# Patient Record
Sex: MALE | Race: WHITE | NOT HISPANIC OR LATINO | Employment: FULL TIME | URBAN - METROPOLITAN AREA
[De-identification: names, ages, dates, MRNs, and addresses within clinical notes are randomized per-mention and may not be internally consistent; named-entity substitution may affect disease eponyms.]

---

## 2017-11-01 ENCOUNTER — OFFICE VISIT (OUTPATIENT)
Dept: URGENT CARE | Facility: CLINIC | Age: 32
End: 2017-11-01
Payer: COMMERCIAL

## 2017-11-01 VITALS
DIASTOLIC BLOOD PRESSURE: 88 MMHG | OXYGEN SATURATION: 98 % | SYSTOLIC BLOOD PRESSURE: 142 MMHG | RESPIRATION RATE: 16 BRPM | HEART RATE: 68 BPM | HEIGHT: 74 IN | BODY MASS INDEX: 31.95 KG/M2 | WEIGHT: 249 LBS | TEMPERATURE: 97.6 F

## 2017-11-01 DIAGNOSIS — I10 ESSENTIAL HYPERTENSION: ICD-10-CM

## 2017-11-01 DIAGNOSIS — R20.2 TINGLING IN EXTREMITIES: ICD-10-CM

## 2017-11-01 DIAGNOSIS — I10 ELEVATED BLOOD PRESSURE READING IN OFFICE WITH DIAGNOSIS OF HYPERTENSION: ICD-10-CM

## 2017-11-01 PROCEDURE — 99214 OFFICE O/P EST MOD 30 MIN: CPT | Performed by: NURSE PRACTITIONER

## 2017-11-01 PROCEDURE — 93000 ELECTROCARDIOGRAM COMPLETE: CPT | Performed by: NURSE PRACTITIONER

## 2017-11-01 RX ORDER — ENALAPRIL MALEATE 20 MG/1
20 TABLET ORAL DAILY
Qty: 30 TAB | Refills: 0 | Status: SHIPPED | OUTPATIENT
Start: 2017-11-01 | End: 2017-11-07 | Stop reason: SDUPTHER

## 2017-11-01 RX ORDER — CLONIDINE HYDROCHLORIDE 0.1 MG/1
0.1 TABLET ORAL ONCE
Status: COMPLETED | OUTPATIENT
Start: 2017-11-01 | End: 2017-11-01

## 2017-11-01 RX ADMIN — CLONIDINE HYDROCHLORIDE 0.1 MG: 0.1 TABLET ORAL at 10:41

## 2017-11-01 NOTE — PROGRESS NOTES
Chief Complaint   Patient presents with   • Blood Pressure Problem     x 1 month, blood pressure was eliseo       HISTORY OF PRESENT ILLNESS: Patient is a 32 y.o. male who presents to urgent care today with complaints of high blood pressure. States he was diagnosed with hypertension several years ago and has been treated with ACE inhibitor in the past with good results. He last was prescribed Enalapril two years ago, which he took consistently, but felt his blood pressure was improving and therefore stopped taking. He checked his blood pressure one month ago and was elevated, therefore he is here today for medication management. He admits to intermittent tingling sensation to bilateral hands over the past several months, otherwise is asymptomatic. He denies chest pain, shortness of breath, neck pain, back pain, nausea, or dizziness.       Patient Active Problem List    Diagnosis Date Noted   • Reactive airway disease with wheezing    • Hypertension        Allergies:Review of patient's allergies indicates no known allergies.    Current Outpatient Prescriptions Ordered in [facility]   Medication Sig Dispense Refill   • Cetirizine HCl (ZYRTEC ALLERGY PO) Take  by mouth.     • enalapril (VASOTEC) 20 MG tablet Take 1 Tab by mouth every day. 30 Tab 0     No current Epic-ordered facility-administered medications on file.        Past Medical History:   Diagnosis Date   • Hypertension    • Reactive airway disease with wheezing     occasional to smoke or mold       Social History   Substance Use Topics   • Smoking status: Former Smoker     Quit date: 3/1/2011   • Smokeless tobacco: Current User      Comment: discussed   • Alcohol use 8.0 oz/week     16 drink(s) per week       Family Status   Relation Status   • Paternal Grandfather    • Paternal Uncle    • Maternal Grandfather      Family History   Problem Relation Age of Onset   • Stroke Paternal Grandfather    • Hypertension Paternal Grandfather    • Hypertension Paternal Uncle   "  • Cancer Maternal Grandfather      prostate, colon       ROS:  Review of Systems   Constitutional: Negative for fever, chills, weight loss, malaise, and fatigue.   HENT: Negative for ear pain, nosebleeds, congestion, sore throat and neck pain.    Eyes: Negative for vision changes.   Neuro: Positive for intermittent hand tingling. Negative for headache, droop, difficulty with speech, weakness, seizure, LOC.   Cardiovascular: Negative for chest pain, palpitations, orthopnea and leg swelling.   Respiratory: Negative for cough, sputum production, shortness of breath and wheezing.   Gastrointestinal: Negative for abdominal pain, nausea, vomiting or diarrhea.   Genitourinary: Negative for dysuria, urgency and frequency.  Musculoskeletal: Negative for falls, neck pain, back pain, joint pain, myalgias.   Skin: Negative for rash, diaphoresis.     Exam:  Blood pressure (!) 172/118, pulse 74, temperature 36.4 °C (97.6 °F), resp. rate 16, height 1.88 m (6' 2.02\"), weight 112.9 kg (249 lb), SpO2 98 %.  General: well-nourished, well-developed male in NAD  Head: normocephalic, atraumatic  Eyes: PERRLA, no conjunctival injection, acuity grossly intact, lids normal.  Ears: normal shape and symmetry, no tenderness, no discharge. External canals are without any significant edema or erythema. Tympanic membranes are without any inflammation, no effusion. Gross auditory acuity is intact.  Nose: symmetrical without tenderness, no discharge.  Mouth/Throat: reasonable hygiene, no erythema, exudates or tonsillar enlargement.  Neck: no masses, range of motion within normal limits, no tracheal deviation. No obvious thyroid enlargement.   Lymph: no cervical adenopathy. No supraclavicular adenopathy.   Neuro: alert and oriented. Cranial nerves 1-12 grossly intact. No sensory deficit.   Cardiovascular: regular rate and rhythm. No edema. Hypertensive today.   Pulmonary: no distress. Chest is symmetrical with respiration, no wheezes, crackles, or " rhonchi.   Musculoskeletal: no clubbing, appropriate muscle tone, gait is stable.  Skin: warm, dry, intact, no clubbing, no cyanosis, no rashes.   Psych: appropriate mood, affect, judgement.         Assessment/Plan:  1. Elevated blood pressure reading in office with diagnosis of hypertension  EKG    clonidine (CATAPRES) tablet 0.1 mg    enalapril (VASOTEC) 20 MG tablet   2. Essential hypertension  enalapril (VASOTEC) 20 MG tablet   3. Tingling in extremities  EKG         EKG Interpretation   Interpreted by me   Rhythm: normal sinus   Rate: normal   Axis: normal   Ectopy: none   Conduction: normal   ST Segments: no acute change   T Waves: no acute change   Q Waves: none   Clinical Impression: no acute changes and normal EKG      Patient given clonidine in the clinic, monitored for 20 minutes, blood pressure reduced to 168/108. He is asymptomatic today but does report intermittent tingling to hands, therefore ECG performed, does not show acute ST changes.   A low salt diet, increasing exercise, and reducing alcohol in diet have all been addressed.   He will be given a short course of Enalapril to take until he can be seen by a PCP. S/S hypotension discussed, if so may cut in half. Monitor and log blood pressures daily.   Supportive care, differential diagnoses, and indications for immediate follow-up discussed with patient.   Pathogenesis of diagnosis discussed including typical length and natural progression.   Instructed to return to clinic or nearest emergency department for any change in condition, further concerns, or worsening of symptoms.  Patient states understanding of the plan of care and discharge instructions.  Instructed to make an appointment, to establish care and for follow up, with a primary care provider.        Please note that this dictation was created using voice recognition software. I have made every reasonable attempt to correct obvious errors, but I expect that there are errors of grammar and  possibly content that I did not discover before finalizing the note.      ARMANDO Stinson.

## 2017-11-07 ENCOUNTER — OFFICE VISIT (OUTPATIENT)
Dept: MEDICAL GROUP | Facility: MEDICAL CENTER | Age: 32
End: 2017-11-07
Payer: COMMERCIAL

## 2017-11-07 VITALS
TEMPERATURE: 97.5 F | OXYGEN SATURATION: 96 % | HEART RATE: 57 BPM | DIASTOLIC BLOOD PRESSURE: 70 MMHG | HEIGHT: 74 IN | BODY MASS INDEX: 32.8 KG/M2 | SYSTOLIC BLOOD PRESSURE: 138 MMHG | WEIGHT: 255.6 LBS

## 2017-11-07 DIAGNOSIS — Z11.3 SCREENING FOR STD (SEXUALLY TRANSMITTED DISEASE): ICD-10-CM

## 2017-11-07 DIAGNOSIS — Z00.00 HEALTHCARE MAINTENANCE: ICD-10-CM

## 2017-11-07 DIAGNOSIS — I10 ESSENTIAL HYPERTENSION: ICD-10-CM

## 2017-11-07 DIAGNOSIS — Z13.1 SCREENING FOR DIABETES MELLITUS: ICD-10-CM

## 2017-11-07 DIAGNOSIS — Z13.6 SCREENING FOR ISCHEMIC HEART DISEASE: ICD-10-CM

## 2017-11-07 DIAGNOSIS — J45.20 MILD INTERMITTENT REACTIVE AIRWAY DISEASE WITH WHEEZING WITHOUT COMPLICATION: ICD-10-CM

## 2017-11-07 PROCEDURE — 99213 OFFICE O/P EST LOW 20 MIN: CPT | Performed by: FAMILY MEDICINE

## 2017-11-07 RX ORDER — ENALAPRIL MALEATE 20 MG/1
20 TABLET ORAL DAILY
Qty: 90 TAB | Refills: 3 | Status: SHIPPED | OUTPATIENT
Start: 2017-11-07 | End: 2018-08-23

## 2017-11-07 ASSESSMENT — PATIENT HEALTH QUESTIONNAIRE - PHQ9: CLINICAL INTERPRETATION OF PHQ2 SCORE: 0

## 2017-11-07 NOTE — ASSESSMENT & PLAN NOTE
The patient declines flu shot today. He is interested in gonorrhea, chlamydia, RPR and HIV testing.

## 2017-11-07 NOTE — ASSESSMENT & PLAN NOTE
The patient has a history of reactive airway disease, described as occasional wheezing and chest tightness after running or exacerbated by smoke. He says this occurs about 3-5 times monthly but he has not taken albuterol for the past 7-12 months. Previously, he has tried albuterol, however, and it does seem to work quite well. He denies any symptoms today. He denies chest pain and shortness of breath.

## 2017-11-07 NOTE — PROGRESS NOTES
[facility]  Progress Note  Established Patient    Subjective:   Vaibhav Guillermo is a 32 y.o. male here today with a chief complaint of HTN. The patient is alone.     Reactive airway disease with wheezing  The patient has a history of reactive airway disease, described as occasional wheezing and chest tightness after running or exacerbated by smoke. He says this occurs about 3-5 times monthly but he has not taken albuterol for the past 7-12 months. Previously, he has tried albuterol, however, and it does seem to work quite well. He denies any symptoms today. He denies chest pain and shortness of breath.    Hypertension  Patient has hypertension. He was recently started on enalapril 20 mg daily. He is tolerating this medication well. He denies chest pain, shortness of breath, headache, numbness, tingling, dizziness, lightheadedness. His blood pressure is at goal.    Healthcare maintenance  The patient declines flu shot today. He is interested in gonorrhea, chlamydia, RPR and HIV testing.      Current Outpatient Prescriptions on File Prior to Visit   Medication Sig Dispense Refill   • Cetirizine HCl (ZYRTEC ALLERGY PO) Take  by mouth.       No current facility-administered medications on file prior to visit.        Past Medical History:   Diagnosis Date   • Allergy    • Asthma    • Hypertension    • Reactive airway disease with wheezing     occasional to smoke or mold       Allergies: Review of patient's allergies indicates no known allergies.    Surgical History:  has no past surgical history on file.    Family History: family history includes Cancer in his maternal grandfather; Hypertension in his father, paternal grandfather, and paternal uncle; No Known Problems in his mother and sister; Stroke in his paternal grandfather.    Social History:  reports that he quit smoking about 6 years ago. He quit smokeless tobacco use 7 days ago. He reports that he drinks about 8.0 oz of alcohol per week . He reports that he  "uses drugs, including Marijuana.    ROS: see HPI. .        Objective:     Vitals:    11/07/17 1209 11/07/17 1212   BP: 140/78 138/70   Pulse: (!) 57    Temp: 36.4 °C (97.5 °F)    SpO2: 96%    Weight: 115.9 kg (255 lb 9.6 oz)    Height: 1.88 m (6' 2.02\")        Physical Exam:  General: alert in no apparent distress.   Cardio: regular rate and rhythm, no murmurs, rubs or gallops.   Resp: CTAB no w/r/r.   Ext: no BLE edema.         Assessment and Plan:     1. Essential hypertension  Doing well and at goal. Asymptomatic.   - enalapril (VASOTEC) 20 MG tablet; Take 1 Tab by mouth every day.  Dispense: 90 Tab; Refill: 3  - BASIC METABOLIC PANEL; Future    2. Mild intermittent reactive airway disease with wheezing without complication  Offered PFT. Patient would like to look into cost.   - Albuterol Sulfate 108 (90 Base) MCG/ACT AEROSOL POWDER, BREATH ACTIVATED; Inhale 1-2 Puffs by mouth every 6 hours as needed (shortness of breath).  Dispense: 1 Each; Refill: 6    3. Healthcare maintenance  See HPI.    Followup: Return in about 3 weeks (around 11/28/2017), or if symptoms worsen or fail to improve.         "

## 2017-11-07 NOTE — ASSESSMENT & PLAN NOTE
Patient has hypertension. He was recently started on enalapril 20 mg daily. He is tolerating this medication well. He denies chest pain, shortness of breath, headache, numbness, tingling, dizziness, lightheadedness. His blood pressure is at goal.

## 2017-11-17 ENCOUNTER — HOSPITAL ENCOUNTER (OUTPATIENT)
Dept: LAB | Facility: MEDICAL CENTER | Age: 32
End: 2017-11-17
Attending: FAMILY MEDICINE
Payer: COMMERCIAL

## 2017-11-17 DIAGNOSIS — Z13.1 SCREENING FOR DIABETES MELLITUS: ICD-10-CM

## 2017-11-17 DIAGNOSIS — Z13.6 SCREENING FOR ISCHEMIC HEART DISEASE: ICD-10-CM

## 2017-11-17 DIAGNOSIS — Z11.3 SCREENING FOR STD (SEXUALLY TRANSMITTED DISEASE): ICD-10-CM

## 2017-11-17 DIAGNOSIS — I10 ESSENTIAL HYPERTENSION: ICD-10-CM

## 2017-11-17 LAB
ANION GAP SERPL CALC-SCNC: 12 MMOL/L (ref 0–11.9)
BUN SERPL-MCNC: 18 MG/DL (ref 8–22)
CALCIUM SERPL-MCNC: 10 MG/DL (ref 8.5–10.5)
CHLORIDE SERPL-SCNC: 103 MMOL/L (ref 96–112)
CHOLEST SERPL-MCNC: 213 MG/DL (ref 100–199)
CO2 SERPL-SCNC: 23 MMOL/L (ref 20–33)
CREAT SERPL-MCNC: 1.11 MG/DL (ref 0.5–1.4)
GFR SERPL CREATININE-BSD FRML MDRD: >60 ML/MIN/1.73 M 2
GLUCOSE SERPL-MCNC: 81 MG/DL (ref 65–99)
HDLC SERPL-MCNC: 38 MG/DL
HIV 1+2 AB+HIV1 P24 AG SERPL QL IA: NON REACTIVE
LDLC SERPL CALC-MCNC: 154 MG/DL
POTASSIUM SERPL-SCNC: 4.1 MMOL/L (ref 3.6–5.5)
SODIUM SERPL-SCNC: 138 MMOL/L (ref 135–145)
TREPONEMA PALLIDUM IGG+IGM AB [PRESENCE] IN SERUM OR PLASMA BY IMMUNOASSAY: NON REACTIVE
TRIGL SERPL-MCNC: 106 MG/DL (ref 0–149)

## 2017-11-17 PROCEDURE — 80061 LIPID PANEL: CPT

## 2017-11-17 PROCEDURE — 86780 TREPONEMA PALLIDUM: CPT

## 2017-11-17 PROCEDURE — 36415 COLL VENOUS BLD VENIPUNCTURE: CPT

## 2017-11-17 PROCEDURE — 87591 N.GONORRHOEAE DNA AMP PROB: CPT

## 2017-11-17 PROCEDURE — 87491 CHLMYD TRACH DNA AMP PROBE: CPT

## 2017-11-17 PROCEDURE — 87389 HIV-1 AG W/HIV-1&-2 AB AG IA: CPT

## 2017-11-17 PROCEDURE — 80048 BASIC METABOLIC PNL TOTAL CA: CPT

## 2017-11-18 LAB
C TRACH DNA SPEC QL NAA+PROBE: NEGATIVE
N GONORRHOEA DNA SPEC QL NAA+PROBE: NEGATIVE
SPECIMEN SOURCE: NORMAL

## 2017-11-20 ENCOUNTER — TELEPHONE (OUTPATIENT)
Dept: MEDICAL GROUP | Facility: MEDICAL CENTER | Age: 32
End: 2017-11-20

## 2017-11-20 NOTE — TELEPHONE ENCOUNTER
----- Message from Rocco Hays M.D. sent at 11/20/2017  7:48 AM PST -----  Please call and inform this patient of the following:  His cholesterol is a little elevated. I encourage him to continue to work on eating well and exercising. Otherwise, his STD screening pane (including gonorrhea, chlamydia, HIV and syphilis testing) was all normal.

## 2017-11-20 NOTE — TELEPHONE ENCOUNTER
I spoke with pt and informed him that his cholesterol is a little elevated and that he needs to eat well and exercise. I also informed pt that his STD panel was normal.

## 2018-05-09 ENCOUNTER — OFFICE VISIT (OUTPATIENT)
Dept: MEDICAL GROUP | Facility: MEDICAL CENTER | Age: 33
End: 2018-05-09
Payer: COMMERCIAL

## 2018-05-09 VITALS
BODY MASS INDEX: 32.39 KG/M2 | HEIGHT: 74 IN | RESPIRATION RATE: 16 BRPM | OXYGEN SATURATION: 95 % | WEIGHT: 252.4 LBS | SYSTOLIC BLOOD PRESSURE: 140 MMHG | HEART RATE: 92 BPM | DIASTOLIC BLOOD PRESSURE: 88 MMHG | TEMPERATURE: 98.5 F

## 2018-05-09 DIAGNOSIS — M54.2 NECK PAIN: ICD-10-CM

## 2018-05-09 DIAGNOSIS — I10 ESSENTIAL HYPERTENSION: ICD-10-CM

## 2018-05-09 PROCEDURE — 99214 OFFICE O/P EST MOD 30 MIN: CPT | Performed by: PHYSICIAN ASSISTANT

## 2018-05-09 RX ORDER — LOSARTAN POTASSIUM 50 MG/1
50 TABLET ORAL DAILY
Qty: 30 TAB | Refills: 3 | Status: SHIPPED | OUTPATIENT
Start: 2018-05-09 | End: 2018-08-06 | Stop reason: SDUPTHER

## 2018-05-09 RX ORDER — CYCLOBENZAPRINE HCL 10 MG
10 TABLET ORAL 3 TIMES DAILY PRN
Qty: 30 TAB | Refills: 0 | Status: SHIPPED | OUTPATIENT
Start: 2018-05-09 | End: 2019-01-26

## 2018-05-09 NOTE — ASSESSMENT & PLAN NOTE
Taking enalapril 20mg daily. Feels it may be causing fatigue, sore throat, decreased libido. Also not completely lowering BP. Still getting 140s/90s at home.     Had a series of episodes of left upper chest tightness a few weeks ago. Was in [place]. No injury. 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 No SOB, nausea, sweating, radiation. Would go away by morning. Not sure if that was BP related. Hasn't happened in a few weeks. No hx of palpitations, irregular heart beat or known heart disease.

## 2018-05-09 NOTE — PROGRESS NOTES
Subjective:   Vaibhav Guillermo is a 32 y.o. male here today for follow up on blood pressure    Hypertension  Taking enalapril 20mg daily. Feels it may be causing fatigue, sore throat, decreased libido. Also not completely lowering BP. Still getting 140s/90s at home.     Had a series of episodes of left upper chest tightness a few weeks ago. Was in [place]. No injury. 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 No SOB, nausea, sweating, radiation. Would go away by morning. Not sure if that was BP related. Hasn't happened in a few weeks. No hx of palpitations, irregular heart beat or known heart disease.    Neck pain  On and off, chronic/episodic. May be related to the way he sleeps. Associated with stiffness. Has used muscle relaxers in the past which was helpful. Also has appt with chiropractor today as well. No hx of injury. No radiation of pain, weakness, numbness in arms.        Current medicines (including changes today)  Current Outpatient Prescriptions   Medication Sig Dispense Refill   • losartan (COZAAR) 50 MG Tab Take 1 Tab by mouth every day for 30 days. 30 Tab 3   • cyclobenzaprine (FLEXERIL) 10 MG Tab Take 1 Tab by mouth 3 times a day as needed. 30 Tab 0   • Albuterol Sulfate 108 (90 Base) MCG/ACT AEROSOL POWDER, BREATH ACTIVATED Inhale 1-2 Puffs by mouth every 6 hours as needed (shortness of breath). 1 Each 6   • enalapril (VASOTEC) 20 MG tablet Take 1 Tab by mouth every day. 90 Tab 3   • Cetirizine HCl (ZYRTEC ALLERGY PO) Take  by mouth.       No current facility-administered medications for this visit.      He  has a past medical history of Allergy; Asthma; Hypertension; and Reactive airway disease with wheezing.    ROS   No fever/chills. No weight change. No headache/dizziness. No focal weakness. No sore throat, nasal congestion, ear pain.No n/v/d/c or abdominal pain. No urinary complaint. No rash or skin lesion. No joint  "redness or swelling.       Objective:     Blood pressure 140/88, pulse 92, temperature 36.9 °C (98.5 °F), resp. rate 16, height 1.88 m (6' 2.02\"), weight 114.5 kg (252 lb 6.4 oz), SpO2 95 %. Body mass index is 32.39 kg/m².   Physical Exam:  Constitutional: WDWN, NAD  Skin: Warm, dry, good turgor, no rashes in visible areas.  Respiratory: Unlabored respiratory effort, lungs clear to auscultation, no wheezes, no ronchi.  Cardiovascular: Normal S1, S2, no murmur, no leg edema.  Psych: Alert and oriented x3, normal affect and mood.        Assessment and Plan:   The following treatment plan was discussed    1. Essential hypertension  Discussed with patient that I am not sure the enalapril was causing any of the symptoms her described however it is ok to switch to losartan and see if that medication works without giving him adverse effects. Will start with 50mg but may need to increase to 100mg daily. f/u with Dr. Hays 2 weeks  - losartan (COZAAR) 50 MG Tab; Take 1 Tab by mouth every day for 30 days.  Dispense: 30 Tab; Refill: 3    2. Neck pain    - cyclobenzaprine (FLEXERIL) 10 MG Tab; Take 1 Tab by mouth 3 times a day as needed.  Dispense: 30 Tab; Refill: 0      Followup: Return for 2 weeks with Dr. Hays.         "

## 2018-05-09 NOTE — ASSESSMENT & PLAN NOTE
On and off, chronic/episodic. May be related to the way he sleeps. Associated with stiffness. Has used muscle relaxers in the past which was helpful. Also has appt with chiropractor today as well. No hx of injury. No radiation of pain, weakness, numbness in arms.

## 2018-05-09 NOTE — PATIENT INSTRUCTIONS
If chest pain, headache, dizziness and BP is elevated, especially 200/100 then go to ER  Hypertension  Hypertension, commonly called high blood pressure, is when the force of blood pumping through your arteries is too strong. Your arteries are the blood vessels that carry blood from your heart throughout your body. A blood pressure reading consists of a higher number over a lower number, such as 110/72. The higher number (systolic) is the pressure inside your arteries when your heart pumps. The lower number (diastolic) is the pressure inside your arteries when your heart relaxes. Ideally you want your blood pressure below 120/80.  Hypertension forces your heart to work harder to pump blood. Your arteries may become narrow or stiff. Having untreated or uncontrolled hypertension can cause heart attack, stroke, kidney disease, and other problems.  What increases the risk?  Some risk factors for high blood pressure are controllable. Others are not.  Risk factors you cannot control include:  · Race. You may be at higher risk if you are .  · Age. Risk increases with age.  · Gender. Men are at higher risk than women before age 45 years. After age 65, women are at higher risk than men.  Risk factors you can control include:  · Not getting enough exercise or physical activity.  · Being overweight.  · Getting too much fat, sugar, calories, or salt in your diet.  · Drinking too much alcohol.  What are the signs or symptoms?  Hypertension does not usually cause signs or symptoms. Extremely high blood pressure (hypertensive crisis) may cause headache, anxiety, shortness of breath, and nosebleed.  How is this diagnosed?  To check if you have hypertension, your health care provider will measure your blood pressure while you are seated, with your arm held at the level of your heart. It should be measured at least twice using the same arm. Certain conditions can cause a difference in blood pressure between your right  and left arms. A blood pressure reading that is higher than normal on one occasion does not mean that you need treatment. If it is not clear whether you have high blood pressure, you may be asked to return on a different day to have your blood pressure checked again. Or, you may be asked to monitor your blood pressure at home for 1 or more weeks.  How is this treated?  Treating high blood pressure includes making lifestyle changes and possibly taking medicine. Living a healthy lifestyle can help lower high blood pressure. You may need to change some of your habits.  Lifestyle changes may include:  · Following the DASH diet. This diet is high in fruits, vegetables, and whole grains. It is low in salt, red meat, and added sugars.  · Keep your sodium intake below 2,300 mg per day.  · Getting at least 30-45 minutes of aerobic exercise at least 4 times per week.  · Losing weight if necessary.  · Not smoking.  · Limiting alcoholic beverages.  · Learning ways to reduce stress.  Your health care provider may prescribe medicine if lifestyle changes are not enough to get your blood pressure under control, and if one of the following is true:  · You are 18-59 years of age and your systolic blood pressure is above 140.  · You are 60 years of age or older, and your systolic blood pressure is above 150.  · Your diastolic blood pressure is above 90.  · You have diabetes, and your systolic blood pressure is over 140 or your diastolic blood pressure is over 90.  · You have kidney disease and your blood pressure is above 140/90.  · You have heart disease and your blood pressure is above 140/90.  Your personal target blood pressure may vary depending on your medical conditions, your age, and other factors.  Follow these instructions at home:  · Have your blood pressure rechecked as directed by your health care provider.  · Take medicines only as directed by your health care provider. Follow the directions carefully. Blood pressure  medicines must be taken as prescribed. The medicine does not work as well when you skip doses. Skipping doses also puts you at risk for problems.  · Do not smoke.  · Monitor your blood pressure at home as directed by your health care provider.  Contact a health care provider if:  · You think you are having a reaction to medicines taken.  · You have recurrent headaches or feel dizzy.  · You have swelling in your ankles.  · You have trouble with your vision.  Get help right away if:  · You develop a severe headache or confusion.  · You have unusual weakness, numbness, or feel faint.  · You have severe chest or abdominal pain.  · You vomit repeatedly.  · You have trouble breathing.  This information is not intended to replace advice given to you by your health care provider. Make sure you discuss any questions you have with your health care provider.  Document Released: 12/18/2006 Document Revised: 05/25/2017 Document Reviewed: 10/10/2014  [employer] Interactive Patient Education © 2017 [employer]    Losartan tablets  What is this medicine?  LOSARTAN (denice MYESHA tan) is used to treat high blood pressure and to reduce the risk of stroke in certain patients. This drug also slows the progression of kidney disease in patients with diabetes.  This medicine may be used for other purposes; ask your health care provider or pharmacist if you have questions.  COMMON BRAND NAME(S): Cozaar  What should I tell my health care provider before I take this medicine?  They need to know if you have any of these conditions:  -heart failure  -kidney or liver disease  -an unusual or allergic reaction to losartan, other medicines, foods, dyes, or preservatives  -pregnant or trying to get pregnant  -breast-feeding  How should I use this medicine?  Take this medicine by mouth with a glass of water. Follow the directions on the prescription label. This medicine can be taken with or without food. Take your doses at regular intervals. Do not take  your medicine more often than directed.  Talk to your pediatrician regarding the use of this medicine in children. Special care may be needed.  Overdosage: If you think you have taken too much of this medicine contact a poison control center or emergency room at once.  NOTE: This medicine is only for you. Do not share this medicine with others.  What if I miss a dose?  If you miss a dose, take it as soon as you can. If it is almost time for your next dose, take only that dose. Do not take double or extra doses.  What may interact with this medicine?  -blood pressure medicines  -diuretics, especially triamterene, spironolactone, or amiloride  -fluconazole  -NSAIDs, medicines for pain and inflammation, like ibuprofen or naproxen  -potassium salts or potassium supplements  -rifampin  This list may not describe all possible interactions. Give your health care provider a list of all the medicines, herbs, non-prescription drugs, or dietary supplements you use. Also tell them if you smoke, drink alcohol, or use illegal drugs. Some items may interact with your medicine.  What should I watch for while using this medicine?  Visit your doctor or health care professional for regular checks on your progress. Check your blood pressure as directed. Ask your doctor or health care professional what your blood pressure should be and when you should contact him or her. Call your doctor or health care professional if you notice an irregular or fast heart beat.  Women should inform their doctor if they wish to become pregnant or think they might be pregnant. There is a potential for serious side effects to an unborn child, particularly in the second or third trimester. Talk to your health care professional or pharmacist for more information.  You may get drowsy or dizzy. Do not drive, use machinery, or do anything that needs mental alertness until you know how this drug affects you. Do not stand or sit up quickly, especially if you are  an older patient. This reduces the risk of dizzy or fainting spells. Alcohol can make you more drowsy and dizzy. Avoid alcoholic drinks.  Avoid salt substitutes unless you are told otherwise by your doctor or health care professional.  Do not treat yourself for coughs, colds, or pain while you are taking this medicine without asking your doctor or health care professional for advice. Some ingredients may increase your blood pressure.  What side effects may I notice from receiving this medicine?  Side effects that you should report to your doctor or health care professional as soon as possible:  -confusion, dizziness, light headedness or fainting spells  -decreased amount of urine passed  -difficulty breathing or swallowing, hoarseness, or tightening of the throat  -fast or irregular heart beat, palpitations, or chest pain  -skin rash, itching  -swelling of your face, lips, tongue, hands, or feet  Side effects that usually do not require medical attention (report to your doctor or health care professional if they continue or are bothersome):  -cough  -decreased sexual function or desire  -headache  -nasal congestion or stuffiness  -nausea or stomach pain  -sore or cramping muscles  This list may not describe all possible side effects. Call your doctor for medical advice about side effects. You may report side effects to FDA at 2-187-FDA-8673.  Where should I keep my medicine?  Keep out of the reach of children.  Store at room temperature between 15 and 30 degrees C (59 and 86 degrees F). Protect from light. Keep container tightly closed. Throw away any unused medicine after the expiration date.  NOTE: This sheet is a summary. It may not cover all possible information. If you have questions about this medicine, talk to your doctor, pharmacist, or health care provider.  © 2018 Elsevier/Gold Standard (2009-02-27 16:42:18)

## 2018-07-30 ENCOUNTER — OFFICE VISIT (OUTPATIENT)
Dept: URGENT CARE | Facility: CLINIC | Age: 33
End: 2018-07-30
Payer: COMMERCIAL

## 2018-07-30 ENCOUNTER — HOSPITAL ENCOUNTER (OUTPATIENT)
Dept: RADIOLOGY | Facility: MEDICAL CENTER | Age: 33
End: 2018-07-30
Attending: PHYSICIAN ASSISTANT
Payer: COMMERCIAL

## 2018-07-30 VITALS
WEIGHT: 255 LBS | OXYGEN SATURATION: 97 % | RESPIRATION RATE: 18 BRPM | DIASTOLIC BLOOD PRESSURE: 82 MMHG | HEIGHT: 74 IN | HEART RATE: 60 BPM | TEMPERATURE: 97.7 F | BODY MASS INDEX: 32.73 KG/M2 | SYSTOLIC BLOOD PRESSURE: 130 MMHG

## 2018-07-30 DIAGNOSIS — K59.00 CONSTIPATION, UNSPECIFIED CONSTIPATION TYPE: ICD-10-CM

## 2018-07-30 DIAGNOSIS — R10.9 ABDOMINAL PAIN, UNSPECIFIED ABDOMINAL LOCATION: ICD-10-CM

## 2018-07-30 LAB
APPEARANCE UR: NORMAL
BILIRUB UR STRIP-MCNC: NORMAL MG/DL
COLOR UR AUTO: YELLOW
GLUCOSE UR STRIP.AUTO-MCNC: NORMAL MG/DL
KETONES UR STRIP.AUTO-MCNC: NORMAL MG/DL
LEUKOCYTE ESTERASE UR QL STRIP.AUTO: NORMAL
NITRITE UR QL STRIP.AUTO: NORMAL
PH UR STRIP.AUTO: 6 [PH] (ref 5–8)
PROT UR QL STRIP: NORMAL MG/DL
RBC UR QL AUTO: NORMAL
SP GR UR STRIP.AUTO: 1.02
UROBILINOGEN UR STRIP-MCNC: 0.2 MG/DL

## 2018-07-30 PROCEDURE — 74177 CT ABD & PELVIS W/CONTRAST: CPT

## 2018-07-30 PROCEDURE — 700117 HCHG RX CONTRAST REV CODE 255: Performed by: PHYSICIAN ASSISTANT

## 2018-07-30 PROCEDURE — 99214 OFFICE O/P EST MOD 30 MIN: CPT | Performed by: PHYSICIAN ASSISTANT

## 2018-07-30 PROCEDURE — 81002 URINALYSIS NONAUTO W/O SCOPE: CPT | Performed by: PHYSICIAN ASSISTANT

## 2018-07-30 RX ADMIN — IOHEXOL 100 ML: 350 INJECTION, SOLUTION INTRAVENOUS at 17:15

## 2018-07-30 RX ADMIN — IOHEXOL 50 ML: 240 INJECTION, SOLUTION INTRATHECAL; INTRAVASCULAR; INTRAVENOUS; ORAL at 17:15

## 2018-07-31 ASSESSMENT — CROHNS DISEASE ACTIVITY INDEX (CDAI): CDAI SCORE: 0

## 2018-07-31 ASSESSMENT — ENCOUNTER SYMPTOMS
NAUSEA: 0
SHORTNESS OF BREATH: 0
CHILLS: 0
BLOOD IN STOOL: 0
VOMITING: 0
ABDOMINAL PAIN: 1
HEMATOCHEZIA: 0
ANOREXIA: 0
DIARRHEA: 0
DIZZINESS: 0
CONSTIPATION: 0
MUSCULOSKELETAL NEGATIVE: 1
FEVER: 0

## 2018-07-31 NOTE — PROGRESS NOTES
"Subjective:      Vaibhav Guillermo is a 33 y.o. male who presents with Abdominal Pain ([id number], abdominal pain, stabbing numbing pain, sore upper neck)            Abdominal Pain   This is a new problem. The current episode started 1 to 4 weeks ago (3 weeks). The onset quality is gradual. The problem occurs constantly. The problem has been waxing and waning. The pain is located in the generalized abdominal region. The pain is at a severity of 3/10. The pain is mild. The quality of the pain is colicky and sharp. The abdominal pain does not radiate. Pertinent negatives include no anorexia, constipation, diarrhea, dysuria, fever, frequency, hematochezia, hematuria, nausea or vomiting. Nothing aggravates the pain. The pain is relieved by nothing. He has tried nothing for the symptoms. Prior diagnostic workup includes CT scan. There is no history of abdominal surgery, Crohn's disease, GERD, irritable bowel syndrome, pancreatitis or PUD.     PMH is significant for HTN, patient currently takes Lisinopril daily.     Review of Systems   Constitutional: Negative for chills and fever.   HENT: Negative for congestion.    Respiratory: Negative for shortness of breath.    Cardiovascular: Negative for chest pain.   Gastrointestinal: Positive for abdominal pain. Negative for anorexia, blood in stool, constipation, diarrhea, hematochezia, nausea and vomiting.   Genitourinary: Negative.  Negative for dysuria, frequency and hematuria.   Musculoskeletal: Negative.    Skin: Negative for rash.   Neurological: Negative for dizziness.        Objective:     /82   Pulse 60   Temp 36.5 °C (97.7 °F)   Resp 18   Ht 1.88 m (6' 2\")   Wt 115.7 kg (255 lb)   SpO2 97%   BMI 32.74 kg/m²      Physical Exam   Constitutional: He is oriented to person, place, and time. He appears well-developed and well-nourished. No distress.   HENT:   Head: Normocephalic and atraumatic.   Eyes: Pupils are equal, round, and reactive to light.   Neck: Normal range " of motion.   Cardiovascular: Normal rate.    Pulmonary/Chest: Effort normal.   Abdominal: Soft. Normal appearance and bowel sounds are normal. He exhibits no distension and no mass. There is generalized tenderness. There is no rigidity, no rebound, no guarding, no CVA tenderness, no tenderness at McBurney's point and negative Barton's sign.   No CVAT bilaterally   Musculoskeletal: Normal range of motion.   Neurological: He is alert and oriented to person, place, and time.   Skin: Skin is warm and dry. He is not diaphoretic.   Psychiatric: He has a normal mood and affect. His behavior is normal.   Nursing note and vitals reviewed.         PMH:  has a past medical history of Allergy; Asthma; Hypertension; and Reactive airway disease with wheezing.  MEDS:   Current Outpatient Prescriptions:   •  LISINOPRIL PO, Take  by mouth., Disp: , Rfl:   •  Cetirizine HCl (ZYRTEC ALLERGY PO), Take  by mouth., Disp: , Rfl:   •  cyclobenzaprine (FLEXERIL) 10 MG Tab, Take 1 Tab by mouth 3 times a day as needed., Disp: 30 Tab, Rfl: 0  •  Albuterol Sulfate 108 (90 Base) MCG/ACT AEROSOL POWDER, BREATH ACTIVATED, Inhale 1-2 Puffs by mouth every 6 hours as needed (shortness of breath)., Disp: 1 Each, Rfl: 6  •  enalapril (VASOTEC) 20 MG tablet, Take 1 Tab by mouth every day., Disp: 90 Tab, Rfl: 3  ALLERGIES: No Known Allergies  SURGHX: History reviewed. No pertinent surgical history.  SOCHX:  reports that he quit smoking about 7 years ago. His smokeless tobacco use includes Chew. He reports that he drinks about 8.0 oz of alcohol per week . He reports that he uses drugs, including Marijuana.  FH: family history includes Cancer in his maternal grandfather; Hypertension in his father, paternal grandfather, and paternal uncle; No Known Problems in his mother and sister; Stroke (age of onset: 50) in his paternal grandfather.    POCT Urinalysis:  Component Results     Component Value Ref Range & Units Status   POC Color yellow  Negative Final    POC Appearance cloudy  Negative Final   POC Leukocyte Esterase neg  Negative Final   POC Nitrites neg  Negative Final   POC Urobiligen 0.2  Negative (0.2) mg/dL Final   POC Protein neg  Negative mg/dL Final   POC Urine PH 6.0  5.0 - 8.0 Final   POC Blood neg  Negative Final   POC Specific Gravity 1.025  <1.005 - >1.030 Final   POC Ketones neg  Negative mg/dL Final   POC Bilirubin neg  Negative mg/dL Final   POC Glucose neg  Negative mg/dL Final   Last Resulted Time   Mon Jul 30, 2018 10:11 AM        Assessment/Plan:     1. Abdominal pain, unspecified abdominal location  - POCT Urinalysis --> normal  - CT-ABDOMEN-PELVIS WITH; Future  Impression       1.  Negative contrast-enhanced CT of the abdomen and pelvis.    2.  Normal appearance of the appendix.    3.  Increased volume of stool throughout the colon which may represent constipation.       2. Constipation, unspecified constipation type    Discussed CT results with patient. Encouraged starting OTC colace BID. Increased fluids and fiber intake. Call or return to office if symptoms persist or worsen. The patient demonstrated a good understanding and agreed with the treatment plan.

## 2018-08-06 DIAGNOSIS — I10 ESSENTIAL HYPERTENSION: ICD-10-CM

## 2018-08-06 RX ORDER — LOSARTAN POTASSIUM 50 MG/1
50 TABLET ORAL DAILY
Qty: 90 TAB | Refills: 1 | Status: SHIPPED | OUTPATIENT
Start: 2018-08-06 | End: 2019-03-04

## 2018-08-23 ENCOUNTER — OFFICE VISIT (OUTPATIENT)
Dept: MEDICAL GROUP | Facility: MEDICAL CENTER | Age: 33
End: 2018-08-23
Payer: COMMERCIAL

## 2018-08-23 VITALS
WEIGHT: 249.45 LBS | DIASTOLIC BLOOD PRESSURE: 88 MMHG | HEART RATE: 78 BPM | SYSTOLIC BLOOD PRESSURE: 138 MMHG | HEIGHT: 74 IN | RESPIRATION RATE: 20 BRPM | OXYGEN SATURATION: 98 % | BODY MASS INDEX: 32.01 KG/M2 | TEMPERATURE: 97.8 F

## 2018-08-23 DIAGNOSIS — Z00.00 HEALTHCARE MAINTENANCE: ICD-10-CM

## 2018-08-23 DIAGNOSIS — M79.672 PAIN IN BOTH FEET: ICD-10-CM

## 2018-08-23 DIAGNOSIS — Z23 NEED FOR VACCINATION: ICD-10-CM

## 2018-08-23 DIAGNOSIS — M54.2 NECK PAIN: ICD-10-CM

## 2018-08-23 DIAGNOSIS — J45.20 MILD INTERMITTENT REACTIVE AIRWAY DISEASE WITH WHEEZING WITHOUT COMPLICATION: ICD-10-CM

## 2018-08-23 DIAGNOSIS — Z13.6 SCREENING FOR ISCHEMIC HEART DISEASE: ICD-10-CM

## 2018-08-23 DIAGNOSIS — I10 ESSENTIAL HYPERTENSION: ICD-10-CM

## 2018-08-23 DIAGNOSIS — Z13.1 SCREENING FOR DIABETES MELLITUS: ICD-10-CM

## 2018-08-23 DIAGNOSIS — M79.671 PAIN IN BOTH FEET: ICD-10-CM

## 2018-08-23 PROCEDURE — 90715 TDAP VACCINE 7 YRS/> IM: CPT | Performed by: FAMILY MEDICINE

## 2018-08-23 PROCEDURE — 99214 OFFICE O/P EST MOD 30 MIN: CPT | Mod: 25 | Performed by: FAMILY MEDICINE

## 2018-08-23 PROCEDURE — 90732 PPSV23 VACC 2 YRS+ SUBQ/IM: CPT | Performed by: FAMILY MEDICINE

## 2018-08-23 PROCEDURE — 90471 IMMUNIZATION ADMIN: CPT | Performed by: FAMILY MEDICINE

## 2018-08-23 PROCEDURE — 90472 IMMUNIZATION ADMIN EACH ADD: CPT | Performed by: FAMILY MEDICINE

## 2018-08-23 RX ORDER — KETOCONAZOLE 20 MG/G
CREAM TOPICAL
Qty: 45 G | Refills: 0 | Status: SHIPPED | OUTPATIENT
Start: 2018-08-23 | End: 2019-01-26

## 2018-08-23 NOTE — ASSESSMENT & PLAN NOTE
The patient describes a several month history of bilateral neck pain with radiations into his occiput. There is no precipitating trauma. Flexeril provides limited relief. Patient saw a chiropractor and this didn't help. There is no sharp shooting pain down either arm. There is no numbness in his fingers.

## 2018-08-23 NOTE — ASSESSMENT & PLAN NOTE
The patient describes bilateral foot cracking with some slight discomfort at the end of the day when he takes his boots off. There is no trauma and the discomfort is superficial. He wonders if he has athlete's foot.

## 2018-08-23 NOTE — ASSESSMENT & PLAN NOTE
Lipids: ordered.   Fasting Glucose: ordered.      Pneumonia vaccine: pneumovax 23 given 08/23/18 (indication: asthma).   Tdap: given 08/23/18.

## 2018-08-23 NOTE — PROGRESS NOTES
[facility]  Progress Note  Established Patient    Subjective:   Vaibhav Guillermo is a 33 y.o. male here today with a chief complaint of neck pain..     Healthcare maintenance  Lipids: ordered.   Fasting Glucose: ordered.      Pneumonia vaccine: pneumovax 23 given 08/23/18 (indication: asthma).   Tdap: given 08/23/18.     Hypertension  The patient's blood pressure is at goal on losartan. He tolerates this medication well.    Neck pain  The patient describes a several month history of bilateral neck pain with radiations into his occiput. There is no precipitating trauma. Flexeril provides limited relief. Patient saw a chiropractor and this didn't help. There is no sharp shooting pain down either arm. There is no numbness in his fingers.    Pain in both feet  The patient describes bilateral foot cracking with some slight discomfort at the end of the day when he takes his boots off. There is no trauma and the discomfort is superficial. He wonders if he has athlete's foot.    Reactive airway disease with wheezing  The patient's reactive airway disease is well controlled with rare use of albuterol.      Current Outpatient Prescriptions on File Prior to Visit   Medication Sig Dispense Refill   • losartan (COZAAR) 50 MG Tab Take 1 Tab by mouth every day. 90 Tab 1   • Albuterol Sulfate 108 (90 Base) MCG/ACT AEROSOL POWDER, BREATH ACTIVATED Inhale 1-2 Puffs by mouth every 6 hours as needed (shortness of breath). 1 Each 6   • cyclobenzaprine (FLEXERIL) 10 MG Tab Take 1 Tab by mouth 3 times a day as needed. 30 Tab 0   • Cetirizine HCl (ZYRTEC ALLERGY PO) Take  by mouth.       No current facility-administered medications on file prior to visit.        Past Medical History:   Diagnosis Date   • Allergy    • Asthma    • Hypertension    • Reactive airway disease with wheezing     occasional to smoke or mold       Allergies: Patient has no known allergies.    Surgical History:  has no past surgical history on file.    Family  "History: family history includes Cancer in his maternal grandfather; Hypertension in his father, paternal grandfather, and paternal uncle; No Known Problems in his mother and sister; Stroke (age of onset: 50) in his paternal grandfather.    Social History:  reports that he quit smoking about 7 years ago. His smokeless tobacco use includes Chew. He reports that he drinks about 8.0 oz of alcohol per week . He reports that he uses drugs, including Marijuana.    ROS: no fever or nausea.        Objective:     Vitals:    08/23/18 1010   BP: 138/88   Pulse: 78   Resp: 20   Temp: 36.6 °C (97.8 °F)   SpO2: 98%   Weight: 113.2 kg (249 lb 7.2 oz)   Height: 1.88 m (6' 2\")       Physical Exam:  General: alert in no apparent distress.   Cardio: regular rate and rhythm, no murmurs, rubs or gallops.   Resp: CTAB no w/r/r.   Neck: No TTP over spine, full ROM.   Feet: no pain over navicular, 5th metatarsal or bilateral malleoli bilaterally. Some slight cracking but no redness.         Assessment and Plan:     1. Healthcare maintenance  - see HPI.   - discussed diet and exercise, Mediterranean Diet handout given.    2. Essential hypertension  This is an established and stable problem.  - continue losartan.   - BASIC METABOLIC PANEL; Future    3. Mild intermittent reactive airway disease with wheezing without complication  This is an established and stable problem..   - continue PRN albuterol.   - Patient advised to call if he requires his albuterol more than twice per week as we would then proceed with pulmonary function testing.    4. Screening for ischemic heart disease  - LIPID PROFILE; Future    5. Screening for diabetes mellitus  - BASIC METABOLIC PANEL; Future    6. Neck pain  Exam normal, suspect paraspinal strain/spasm.   - discussed stretching/exercises. Handbook given.   - call if no resolution in 6 weeks as we would then proceed with DX.     7. Pain in both feet  Suspect xerosis, patient requests tx for Athletes foot.   - " discussed washing feet, using emollients.   - ketoconazole (NIZORAL) 2 % Cream; Apply a thin to feet layer daily x 2 weeks.  Dispense: 45 g; Refill: 0        Followup: Return in about 6 months (around 2/23/2019), or if symptoms worsen or fail to improve.

## 2018-12-31 ENCOUNTER — OFFICE VISIT (OUTPATIENT)
Dept: URGENT CARE | Facility: PHYSICIAN GROUP | Age: 33
End: 2018-12-31
Payer: COMMERCIAL

## 2018-12-31 VITALS
RESPIRATION RATE: 16 BRPM | SYSTOLIC BLOOD PRESSURE: 164 MMHG | OXYGEN SATURATION: 93 % | DIASTOLIC BLOOD PRESSURE: 100 MMHG | BODY MASS INDEX: 32.73 KG/M2 | TEMPERATURE: 99.1 F | WEIGHT: 255 LBS | HEIGHT: 74 IN | HEART RATE: 74 BPM

## 2018-12-31 DIAGNOSIS — R03.0 ELEVATED BLOOD PRESSURE READING: ICD-10-CM

## 2018-12-31 DIAGNOSIS — I10 ESSENTIAL HYPERTENSION: ICD-10-CM

## 2018-12-31 PROCEDURE — 99214 OFFICE O/P EST MOD 30 MIN: CPT | Mod: 25 | Performed by: NURSE PRACTITIONER

## 2018-12-31 PROCEDURE — 93000 ELECTROCARDIOGRAM COMPLETE: CPT | Performed by: NURSE PRACTITIONER

## 2018-12-31 RX ORDER — LOSARTAN POTASSIUM 50 MG/1
50 TABLET ORAL DAILY
Qty: 30 TAB | Refills: 0 | Status: SHIPPED | OUTPATIENT
Start: 2018-12-31 | End: 2019-01-26

## 2019-01-01 NOTE — PROGRESS NOTES
"Subjective:      Vaibhav Guillermo is a 33 y.o. male who presents with No chief complaint on file.            HPI  C/o chest pain on and off x 2 weeks, admits to not taking BP meds. None taken today, but takes Losartan x last 3 months. Last EKG taken 11/1/17 with elevated blood pressure. Runs \"140s/90s\" when he takes his BP meds. Blood cuff \"broke\" a month ago. Usually takes Losartan in \"late midday\". Last 90 day refill was 8/6/2018. Admits to minimal cardio but will work out with weights.    PMH:  has a past medical history of Allergy; Asthma; Hypertension; and Reactive airway disease with wheezing.  MEDS:   Current Outpatient Prescriptions:   •  losartan (COZAAR) 50 MG Tab, Take 1 Tab by mouth every day., Disp: 90 Tab, Rfl: 1  •  Albuterol Sulfate 108 (90 Base) MCG/ACT AEROSOL POWDER, BREATH ACTIVATED, Inhale 1-2 Puffs by mouth every 6 hours as needed (shortness of breath)., Disp: 1 Each, Rfl: 6  •  Cetirizine HCl (ZYRTEC ALLERGY PO), Take  by mouth., Disp: , Rfl:   •  ketoconazole (NIZORAL) 2 % Cream, Apply a thin to feet layer daily x 2 weeks., Disp: 45 g, Rfl: 0  •  cyclobenzaprine (FLEXERIL) 10 MG Tab, Take 1 Tab by mouth 3 times a day as needed., Disp: 30 Tab, Rfl: 0  ALLERGIES: No Known Allergies  SURGHX: History reviewed. No pertinent surgical history.  SOCHX:  reports that he quit smoking about 7 years ago. His smokeless tobacco use includes Chew. He reports that he drinks about 8.0 oz of alcohol per week . He reports that he uses drugs, including Marijuana.  FH: Family history was reviewed, no pertinent findings to report    Review of Systems   Constitutional: Negative for chills, fever and malaise/fatigue.   Eyes: Negative for blurred vision, double vision, photophobia and pain.   Respiratory: Negative for cough and shortness of breath.    Cardiovascular: Negative for chest pain, palpitations, orthopnea and leg swelling.   Gastrointestinal: Negative for abdominal pain, heartburn, nausea and vomiting. " "  Musculoskeletal: Negative for back pain and myalgias.   Skin: Negative for itching and rash.   Neurological: Negative for dizziness, tingling, sensory change, speech change, focal weakness, weakness and headaches.   Psychiatric/Behavioral: The patient is not nervous/anxious.    All other systems reviewed and are negative.         Objective:     BP (!) 164/100 (BP Location: Left arm, Patient Position: Sitting, BP Cuff Size: Large adult)   Pulse 74   Temp 37.3 °C (99.1 °F) (Temporal)   Resp 16   Ht 1.88 m (6' 2\")   Wt 115.7 kg (255 lb)   SpO2 93%   BMI 32.74 kg/m²      Physical Exam   Constitutional: He is oriented to person, place, and time. He appears well-developed and well-nourished. He is active and cooperative.  Non-toxic appearance. He does not have a sickly appearance. He does not appear ill. No distress.   Eyes: Pupils are equal, round, and reactive to light. Conjunctivae and EOM are normal.   Neck: Normal range of motion. Neck supple.   Cardiovascular: Normal rate, regular rhythm, S1 normal, S2 normal and normal heart sounds.    Pulses:       Radial pulses are 2+ on the right side, and 2+ on the left side.   Pulmonary/Chest: Effort normal and breath sounds normal. No accessory muscle usage. No respiratory distress. He has no decreased breath sounds. He has no wheezes. He has no rhonchi. He has no rales.   Abdominal: Soft. Bowel sounds are normal. He exhibits no distension. There is no tenderness. There is no rebound and no guarding.   Musculoskeletal: Normal range of motion.   Neurological: He is alert and oriented to person, place, and time. He has normal strength. No cranial nerve deficit or sensory deficit. Coordination and gait normal. GCS eye subscore is 4. GCS verbal subscore is 5. GCS motor subscore is 6.   Skin: Skin is warm and dry. He is not diaphoretic.   Psychiatric: He has a normal mood and affect. His speech is normal and behavior is normal. Judgment and thought content normal. " "Cognition and memory are normal.   Discussed in length about importance of taking BP meds when prescribed. Discussed dangers of uncontrolled HTN and symptoms that could indicate MI or stroke, as well as kidney failure. Discussed lifestyle and cardio heart health importance. Discussed BP cuff importance and symptom management. Patient thankful and could reiterate info when asked. He is attentive.   Vitals reviewed.            EKG: NSR, HR 69, ST elevation- \"normal early repolarization\"  Assessment/Plan:     1. Elevated blood pressure reading    - EKG - Clinic Performed  - losartan (COZAAR) 50 MG Tab; Take 1 Tab by mouth every day.  Dispense: 30 Tab; Refill: 0    2. Essential hypertension    - losartan (COZAAR) 50 MG Tab; Take 1 Tab by mouth every day.  Dispense: 30 Tab; Refill: 0    Avoid high salt, processed, high fatty foods  Incorporate a healthy balanced diet of fruits, vegetables and lean meats  Increase activity level daily  Monitor high stress and poor sleeping habits  Increase water intake and decrease caffeine and sugary drinks  Invest in BP machine to monitor BP, changes batteries frequently for accurate readings  Keep log of BP readings for future PCP appointment   Recheck with continuous elevated BP readings >150/90 with symptoms  Monitor for signs of elevated HTN like dizziness, HA, vision change, n/v, numbness in upper extremities, CP, SOB- call 911 to evaluate immediately  Keep appt with PCP on 3/1/2019, contact them for further refills, patient understands this    "

## 2019-01-04 ENCOUNTER — HOSPITAL ENCOUNTER (OUTPATIENT)
Dept: LAB | Facility: MEDICAL CENTER | Age: 34
End: 2019-01-04
Attending: FAMILY MEDICINE
Payer: COMMERCIAL

## 2019-01-04 DIAGNOSIS — Z13.6 SCREENING FOR ISCHEMIC HEART DISEASE: ICD-10-CM

## 2019-01-04 DIAGNOSIS — I10 ESSENTIAL HYPERTENSION: ICD-10-CM

## 2019-01-04 DIAGNOSIS — Z13.1 SCREENING FOR DIABETES MELLITUS: ICD-10-CM

## 2019-01-04 LAB
ANION GAP SERPL CALC-SCNC: 10 MMOL/L (ref 0–11.9)
BUN SERPL-MCNC: 20 MG/DL (ref 8–22)
CALCIUM SERPL-MCNC: 10.4 MG/DL (ref 8.5–10.5)
CHLORIDE SERPL-SCNC: 101 MMOL/L (ref 96–112)
CHOLEST SERPL-MCNC: 219 MG/DL (ref 100–199)
CO2 SERPL-SCNC: 24 MMOL/L (ref 20–33)
CREAT SERPL-MCNC: 1.21 MG/DL (ref 0.5–1.4)
FASTING STATUS PATIENT QL REPORTED: NORMAL
GLUCOSE SERPL-MCNC: 87 MG/DL (ref 65–99)
HDLC SERPL-MCNC: 39 MG/DL
LDLC SERPL CALC-MCNC: 149 MG/DL
POTASSIUM SERPL-SCNC: 3.8 MMOL/L (ref 3.6–5.5)
SODIUM SERPL-SCNC: 135 MMOL/L (ref 135–145)
TRIGL SERPL-MCNC: 153 MG/DL (ref 0–149)

## 2019-01-04 PROCEDURE — 80048 BASIC METABOLIC PNL TOTAL CA: CPT

## 2019-01-04 PROCEDURE — 80061 LIPID PANEL: CPT

## 2019-01-04 PROCEDURE — 36415 COLL VENOUS BLD VENIPUNCTURE: CPT

## 2019-01-04 ASSESSMENT — ENCOUNTER SYMPTOMS
FOCAL WEAKNESS: 0
SPEECH CHANGE: 0
FEVER: 0
SHORTNESS OF BREATH: 0
HEADACHES: 0
NERVOUS/ANXIOUS: 0
PHOTOPHOBIA: 0
DOUBLE VISION: 0
DIZZINESS: 0
HEARTBURN: 0
ORTHOPNEA: 0
BLURRED VISION: 0
NAUSEA: 0
VOMITING: 0
WEAKNESS: 0
TINGLING: 0
PALPITATIONS: 0
BACK PAIN: 0
EYE PAIN: 0
CHILLS: 0
ABDOMINAL PAIN: 0
SENSORY CHANGE: 0
MYALGIAS: 0
COUGH: 0

## 2019-01-26 ENCOUNTER — HOSPITAL ENCOUNTER (OUTPATIENT)
Facility: MEDICAL CENTER | Age: 34
End: 2019-01-27
Attending: EMERGENCY MEDICINE | Admitting: HOSPITALIST
Payer: COMMERCIAL

## 2019-01-26 ENCOUNTER — APPOINTMENT (OUTPATIENT)
Dept: RADIOLOGY | Facility: MEDICAL CENTER | Age: 34
End: 2019-01-26
Attending: EMERGENCY MEDICINE
Payer: COMMERCIAL

## 2019-01-26 DIAGNOSIS — R07.9 CHEST PAIN, UNSPECIFIED TYPE: ICD-10-CM

## 2019-01-26 PROBLEM — E78.5 HYPERLIPIDEMIA: Status: ACTIVE | Noted: 2019-01-26

## 2019-01-26 PROBLEM — Z72.0 TOBACCO ABUSE: Status: ACTIVE | Noted: 2019-01-26

## 2019-01-26 LAB
ANION GAP SERPL CALC-SCNC: 10 MMOL/L (ref 0–11.9)
BASOPHILS # BLD AUTO: 0.6 % (ref 0–1.8)
BASOPHILS # BLD: 0.04 K/UL (ref 0–0.12)
BUN SERPL-MCNC: 14 MG/DL (ref 8–22)
CALCIUM SERPL-MCNC: 9.4 MG/DL (ref 8.4–10.2)
CHLORIDE SERPL-SCNC: 102 MMOL/L (ref 96–112)
CO2 SERPL-SCNC: 23 MMOL/L (ref 20–33)
CREAT SERPL-MCNC: 0.96 MG/DL (ref 0.5–1.4)
EKG IMPRESSION: NORMAL
EOSINOPHIL # BLD AUTO: 0.5 K/UL (ref 0–0.51)
EOSINOPHIL NFR BLD: 7.6 % (ref 0–6.9)
ERYTHROCYTE [DISTWIDTH] IN BLOOD BY AUTOMATED COUNT: 42.5 FL (ref 35.9–50)
GLUCOSE SERPL-MCNC: 99 MG/DL (ref 65–99)
HCT VFR BLD AUTO: 44.8 % (ref 42–52)
HGB BLD-MCNC: 15.4 G/DL (ref 14–18)
IMM GRANULOCYTES # BLD AUTO: 0.02 K/UL (ref 0–0.11)
IMM GRANULOCYTES NFR BLD AUTO: 0.3 % (ref 0–0.9)
LYMPHOCYTES # BLD AUTO: 2.12 K/UL (ref 1–4.8)
LYMPHOCYTES NFR BLD: 32.3 % (ref 22–41)
MCH RBC QN AUTO: 32.8 PG (ref 27–33)
MCHC RBC AUTO-ENTMCNC: 34.4 G/DL (ref 33.7–35.3)
MCV RBC AUTO: 95.5 FL (ref 81.4–97.8)
MONOCYTES # BLD AUTO: 0.69 K/UL (ref 0–0.85)
MONOCYTES NFR BLD AUTO: 10.5 % (ref 0–13.4)
NEUTROPHILS # BLD AUTO: 3.2 K/UL (ref 1.82–7.42)
NEUTROPHILS NFR BLD: 48.7 % (ref 44–72)
NRBC # BLD AUTO: 0 K/UL
NRBC BLD-RTO: 0 /100 WBC
PLATELET # BLD AUTO: 305 K/UL (ref 164–446)
PMV BLD AUTO: 10 FL (ref 9–12.9)
POTASSIUM SERPL-SCNC: 3.6 MMOL/L (ref 3.6–5.5)
RBC # BLD AUTO: 4.69 M/UL (ref 4.7–6.1)
SODIUM SERPL-SCNC: 135 MMOL/L (ref 135–145)
TROPONIN I SERPL-MCNC: <0.02 NG/ML (ref 0–0.04)
TSH SERPL DL<=0.005 MIU/L-ACNC: 2.46 UIU/ML (ref 0.38–5.33)
WBC # BLD AUTO: 6.6 K/UL (ref 4.8–10.8)

## 2019-01-26 PROCEDURE — 80048 BASIC METABOLIC PNL TOTAL CA: CPT

## 2019-01-26 PROCEDURE — 84484 ASSAY OF TROPONIN QUANT: CPT

## 2019-01-26 PROCEDURE — 84443 ASSAY THYROID STIM HORMONE: CPT

## 2019-01-26 PROCEDURE — 99285 EMERGENCY DEPT VISIT HI MDM: CPT

## 2019-01-26 PROCEDURE — 93005 ELECTROCARDIOGRAM TRACING: CPT

## 2019-01-26 PROCEDURE — 71045 X-RAY EXAM CHEST 1 VIEW: CPT

## 2019-01-26 PROCEDURE — G0378 HOSPITAL OBSERVATION PER HR: HCPCS

## 2019-01-26 PROCEDURE — A9270 NON-COVERED ITEM OR SERVICE: HCPCS | Performed by: EMERGENCY MEDICINE

## 2019-01-26 PROCEDURE — 700102 HCHG RX REV CODE 250 W/ 637 OVERRIDE(OP): Performed by: EMERGENCY MEDICINE

## 2019-01-26 PROCEDURE — 85025 COMPLETE CBC W/AUTO DIFF WBC: CPT

## 2019-01-26 PROCEDURE — 99219 PR INITIAL OBSERVATION CARE,LEVL II: CPT | Performed by: HOSPITALIST

## 2019-01-26 RX ORDER — ASPIRIN 600 MG/1
300 SUPPOSITORY RECTAL DAILY
Status: DISCONTINUED | OUTPATIENT
Start: 2019-01-26 | End: 2019-01-26

## 2019-01-26 RX ORDER — ASPIRIN 81 MG/1
324 TABLET, CHEWABLE ORAL DAILY
Status: DISCONTINUED | OUTPATIENT
Start: 2019-01-27 | End: 2019-01-27 | Stop reason: HOSPADM

## 2019-01-26 RX ORDER — ASPIRIN 325 MG
325 TABLET ORAL DAILY
Status: DISCONTINUED | OUTPATIENT
Start: 2019-01-27 | End: 2019-01-27 | Stop reason: HOSPADM

## 2019-01-26 RX ORDER — ALBUTEROL SULFATE 90 UG/1
1-2 AEROSOL, METERED RESPIRATORY (INHALATION)
Status: DISCONTINUED | OUTPATIENT
Start: 2019-01-26 | End: 2019-01-27 | Stop reason: HOSPADM

## 2019-01-26 RX ORDER — ASPIRIN 325 MG
325 TABLET ORAL DAILY
Status: DISCONTINUED | OUTPATIENT
Start: 2019-01-26 | End: 2019-01-26

## 2019-01-26 RX ORDER — ASPIRIN 600 MG/1
300 SUPPOSITORY RECTAL DAILY
Status: DISCONTINUED | OUTPATIENT
Start: 2019-01-27 | End: 2019-01-27 | Stop reason: HOSPADM

## 2019-01-26 RX ORDER — ASPIRIN 81 MG/1
324 TABLET, CHEWABLE ORAL DAILY
Status: DISCONTINUED | OUTPATIENT
Start: 2019-01-26 | End: 2019-01-26

## 2019-01-26 RX ORDER — LOSARTAN POTASSIUM 25 MG/1
50 TABLET ORAL DAILY
Status: DISCONTINUED | OUTPATIENT
Start: 2019-01-27 | End: 2019-01-27 | Stop reason: HOSPADM

## 2019-01-26 RX ADMIN — ASPIRIN 325 MG: 325 TABLET, DELAYED RELEASE ORAL at 20:32

## 2019-01-26 ASSESSMENT — PATIENT HEALTH QUESTIONNAIRE - PHQ9
2. FEELING DOWN, DEPRESSED, IRRITABLE, OR HOPELESS: NOT AT ALL
1. LITTLE INTEREST OR PLEASURE IN DOING THINGS: NOT AT ALL
SUM OF ALL RESPONSES TO PHQ9 QUESTIONS 1 AND 2: 0

## 2019-01-26 ASSESSMENT — LIFESTYLE VARIABLES
TOTAL SCORE: 0
AVERAGE NUMBER OF DAYS PER WEEK YOU HAVE A DRINK CONTAINING ALCOHOL: 1
HAVE YOU EVER FELT YOU SHOULD CUT DOWN ON YOUR DRINKING: NO
ALCOHOL_USE: YES
EVER FELT BAD OR GUILTY ABOUT YOUR DRINKING: NO
EVER HAD A DRINK FIRST THING IN THE MORNING TO STEADY YOUR NERVES TO GET RID OF A HANGOVER: NO
CONSUMPTION TOTAL: NEGATIVE
HAVE PEOPLE ANNOYED YOU BY CRITICIZING YOUR DRINKING: NO
ON A TYPICAL DAY WHEN YOU DRINK ALCOHOL HOW MANY DRINKS DO YOU HAVE: 4
EVER_SMOKED: YES
TOTAL SCORE: 0
HOW MANY TIMES IN THE PAST YEAR HAVE YOU HAD 5 OR MORE DRINKS IN A DAY: 0
TOTAL SCORE: 0

## 2019-01-26 ASSESSMENT — COPD QUESTIONNAIRES
IN THE PAST 12 MONTHS DO YOU DO LESS THAN YOU USED TO BECAUSE OF YOUR BREATHING PROBLEMS: DISAGREE/UNSURE
COPD SCREENING SCORE: 2
HAVE YOU SMOKED AT LEAST 100 CIGARETTES IN YOUR ENTIRE LIFE: YES
DO YOU EVER COUGH UP ANY MUCUS OR PHLEGM?: NO/ONLY WITH OCCASIONAL COLDS OR INFECTIONS
DURING THE PAST 4 WEEKS HOW MUCH DID YOU FEEL SHORT OF BREATH: NONE/LITTLE OF THE TIME

## 2019-01-26 ASSESSMENT — ENCOUNTER SYMPTOMS
NAUSEA: 0
GASTROINTESTINAL NEGATIVE: 1
CONSTITUTIONAL NEGATIVE: 1
BACK PAIN: 0
ABDOMINAL PAIN: 0
COUGH: 0
SHORTNESS OF BREATH: 0
BRUISES/BLEEDS EASILY: 0
MYALGIAS: 0
RESPIRATORY NEGATIVE: 1
NEUROLOGICAL NEGATIVE: 1
FLANK PAIN: 0
CHILLS: 0
PSYCHIATRIC NEGATIVE: 1
WEAKNESS: 0
LOSS OF CONSCIOUSNESS: 0
VOMITING: 0
DEPRESSION: 0
WEIGHT LOSS: 0
DIZZINESS: 0
FEVER: 0
MUSCULOSKELETAL NEGATIVE: 1
NERVOUS/ANXIOUS: 0

## 2019-01-26 ASSESSMENT — COGNITIVE AND FUNCTIONAL STATUS - GENERAL
SUGGESTED CMS G CODE MODIFIER DAILY ACTIVITY: CH
DAILY ACTIVITIY SCORE: 24
MOBILITY SCORE: 24
SUGGESTED CMS G CODE MODIFIER MOBILITY: CH

## 2019-01-27 ENCOUNTER — APPOINTMENT (OUTPATIENT)
Dept: RADIOLOGY | Facility: MEDICAL CENTER | Age: 34
End: 2019-01-27
Attending: HOSPITALIST
Payer: COMMERCIAL

## 2019-01-27 ENCOUNTER — PATIENT OUTREACH (OUTPATIENT)
Dept: HEALTH INFORMATION MANAGEMENT | Facility: OTHER | Age: 34
End: 2019-01-27

## 2019-01-27 VITALS
TEMPERATURE: 97.6 F | RESPIRATION RATE: 16 BRPM | HEART RATE: 47 BPM | DIASTOLIC BLOOD PRESSURE: 81 MMHG | BODY MASS INDEX: 32.23 KG/M2 | SYSTOLIC BLOOD PRESSURE: 135 MMHG | OXYGEN SATURATION: 96 % | WEIGHT: 251.1 LBS | HEIGHT: 74 IN

## 2019-01-27 LAB — TROPONIN I SERPL-MCNC: <0.02 NG/ML (ref 0–0.04)

## 2019-01-27 PROCEDURE — 700111 HCHG RX REV CODE 636 W/ 250 OVERRIDE (IP)

## 2019-01-27 PROCEDURE — 84484 ASSAY OF TROPONIN QUANT: CPT

## 2019-01-27 PROCEDURE — 93018 CV STRESS TEST I&R ONLY: CPT | Performed by: INTERNAL MEDICINE

## 2019-01-27 PROCEDURE — G0378 HOSPITAL OBSERVATION PER HR: HCPCS

## 2019-01-27 PROCEDURE — A9502 TC99M TETROFOSMIN: HCPCS

## 2019-01-27 PROCEDURE — A9270 NON-COVERED ITEM OR SERVICE: HCPCS | Performed by: HOSPITALIST

## 2019-01-27 PROCEDURE — 700102 HCHG RX REV CODE 250 W/ 637 OVERRIDE(OP): Performed by: HOSPITALIST

## 2019-01-27 PROCEDURE — 99217 PR OBSERVATION CARE DISCHARGE: CPT | Performed by: HOSPITALIST

## 2019-01-27 PROCEDURE — 78452 HT MUSCLE IMAGE SPECT MULT: CPT | Mod: 26 | Performed by: INTERNAL MEDICINE

## 2019-01-27 RX ORDER — CETIRIZINE HYDROCHLORIDE 10 MG/1
10 TABLET ORAL PRN
COMMUNITY
End: 2020-08-07

## 2019-01-27 RX ORDER — REGADENOSON 0.08 MG/ML
INJECTION, SOLUTION INTRAVENOUS
Status: COMPLETED
Start: 2019-01-27 | End: 2019-01-27

## 2019-01-27 RX ORDER — CHLORAL HYDRATE 500 MG
2000 CAPSULE ORAL DAILY
COMMUNITY
End: 2021-03-30

## 2019-01-27 RX ADMIN — REGADENOSON 0.4 MG: 0.08 INJECTION, SOLUTION INTRAVENOUS at 09:16

## 2019-01-27 RX ADMIN — ASPIRIN 325 MG ORAL TABLET 325 MG: 325 PILL ORAL at 06:33

## 2019-01-27 RX ADMIN — LOSARTAN POTASSIUM 50 MG: 25 TABLET, FILM COATED ORAL at 06:33

## 2019-01-27 NOTE — PROGRESS NOTES
Bedside report received from Yumiko SHRESTHA. Assumed care. POC discussed. Pt resting comfortably in bed. Safety precautions in place.

## 2019-01-27 NOTE — DISCHARGE SUMMARY
Discharge Summary    CHIEF COMPLAINT ON ADMISSION  Chief Complaint   Patient presents with   • Chest Pain     started around neetu  L chest /breast area   denies injury    • Numbness     of L arm and palm        Reason for Admission  chest pain     Admission Date  1/26/2019    CODE STATUS  No Order    HPI & HOSPITAL COURSE  This is a 33 y.o. male here with chest pain and palpitations. He was admitted and ruled out for a myocardial infarction with serial troponins. He had a negative stress test. His telemetry was remarkable for occasional PACs. He otherwise had a negative workup and imaging. He may be having palpitations from these PACs and was educated on stopping caffeine for now. He has a significant amount of anxiety about 2 coworkers that have had lethal arrhythmias recently. He will be discharged and follow up with his PCp for a recheck. If he continues to have symptoms, a referral for an event monitor may be needed at the discretion of his PCP. He is feeling well at the time of discharge. He understands and agrees wiht this plan.         Therefore, he is discharged in good and stable condition to home with close outpatient follow-up.        Discharge Date  1/27/2019    FOLLOW UP ITEMS POST DISCHARGE  pcp    DISCHARGE DIAGNOSES  Active Problems:    Hypertension POA: Yes    Chest pain POA: Yes    Tobacco abuse POA: Yes    Hyperlipidemia POA: Yes  Resolved Problems:    * No resolved hospital problems. *      FOLLOW UP  Future Appointments  Date Time Provider Department Center   2/1/2019 9:20 AM OFELIA Finn   3/1/2019 8:00 AM OFELIA Vega M.D.  [address]  [address] [place] [id number]  [phone]            MEDICATIONS ON DISCHARGE     Medication List      CONTINUE taking these medications      Instructions   aspirin EC 81 MG Tbec  Commonly known as:  ECOTRIN   Take 81 mg by mouth as needed (For chest pain).  Dose:  81 mg      cetirizine 10 MG Tabs  Commonly known as:  ZYRTEC   Take 10 mg by mouth as needed for Allergies.  Dose:  10 mg     fish oil 1000 MG Caps capsule   Take 2,000 mg by mouth every day.  Dose:  2000 mg     FLAXSEED OIL PO   Take 1 Cap by mouth every day.  Dose:  1 Cap     losartan 50 MG Tabs  Commonly known as:  COZAAR   Take 1 Tab by mouth every day.  Dose:  50 mg     multivitamin Tabs   Take 3 Tabs by mouth every day.  Dose:  3 Tab            Allergies  No Known Allergies    DIET  No orders of the defined types were placed in this encounter.      ACTIVITY  As tolerated.  Weight bearing as tolerated    CONSULTATIONS  none    PROCEDURES  none    LABORATORY  Lab Results   Component Value Date    SODIUM 135 01/26/2019    POTASSIUM 3.6 01/26/2019    CHLORIDE 102 01/26/2019    CO2 23 01/26/2019    GLUCOSE 99 01/26/2019    BUN 14 01/26/2019    CREATININE 0.96 01/26/2019        Lab Results   Component Value Date    WBC 6.6 01/26/2019    HEMOGLOBIN 15.4 01/26/2019    HEMATOCRIT 44.8 01/26/2019    PLATELETCT 305 01/26/2019

## 2019-01-27 NOTE — ED PROVIDER NOTES
ED Provider Note    CHIEF COMPLAINT  Chief Complaint   Patient presents with   • Chest Pain     started around neetu  L chest /breast area   denies injury    • Numbness     of L arm and palm        HPI  Vaibhav Guillermo is a 33 y.o. male who presents to the emergency department complaining of about 1 month of on and off episodes of chest pain.  The patient typically has discomfort on the left side of the chest sometimes it is very brief sometimes it lasts for a longer period of time and is occasionally associated with some numbness along the left side of the chest and left arm.  He is recently seen his primary care doctor and was noted to have high cholesterol and hypertension he was started on Cozaar in December but despite starting that medication he is still experiencing high blood pressure measurements at home and he did bring a record with him indicating systolic pressures in the 150 range.    REVIEW OF SYSTEMS no hemoptysis no shortness of breath he does not feel the pain is exertional there is been no trauma.  All other systems negative    PAST MEDICAL HISTORY  Past Medical History:   Diagnosis Date   • Allergy    • Asthma    • Hypertension    • Reactive airway disease with wheezing     occasional to smoke or mold       FAMILY HISTORY  Family History   Problem Relation Age of Onset   • Stroke Paternal Grandfather 50   • Hypertension Paternal Grandfather    • Hypertension Paternal Uncle    • Cancer Maternal Grandfather         prostate, colon   • No Known Problems Mother    • Hypertension Father    • No Known Problems Sister        SOCIAL HISTORY  Social History     Social History   • Marital status: Single     Spouse name: N/A   • Number of children: N/A   • Years of education: N/A     Social History Main Topics   • Smoking status: Former Smoker     Quit date: 3/1/2011   • Smokeless tobacco: Current User     Types: Chew     Last attempt to quit: 10/31/2017      Comment: discussed   • Alcohol use 8.0  "oz/week     16 Standard drinks or equivalent per week      Comment: Over 20 drinks every other weekend   • Drug use: Yes     Types: Marijuana      Comment: Occasional   • Sexual activity: Yes     Partners: Female     Other Topics Concern   • Not on file     Social History Narrative   • No narrative on file       SURGICAL HISTORY  History reviewed. No pertinent surgical history.    CURRENT MEDICATIONS  Home Medications     Reviewed by Adele Rosenbaum R.N. (Registered Nurse) on 01/26/19 at [facility]  Med List Status: Partial   Medication Last Dose Status   Albuterol Sulfate 108 (90 Base) MCG/ACT AEROSOL POWDER, BREATH ACTIVATED prn Active   Cetirizine HCl (ZYRTEC ALLERGY PO) prn Active   losartan (COZAAR) 50 MG Tab  Active                ALLERGIES  No Known Allergies    PHYSICAL EXAM  VITAL SIGNS: /103   Pulse 70   Temp 36.7 °C (98.1 °F) (Temporal)   Resp 16   Ht 1.88 m (6' 2\")   Wt 116.3 kg (256 lb 6.3 oz)   SpO2 94%   BMI 32.92 kg/m²    Oxygen saturation is interpreted as adequate  Constitutional: Awake verbal generally well-appearing individual in no distress  HENT: Mucous membranes are moist  Eyes: No erythema discharge or jaundice  Neck: Trachea midline no JVD  Cardiovascular: Regular rate and rhythm  Lungs: Clear and equal bilaterally  Abdomen/Back: Moderately overweight no rebound guarding or peritoneal findings or abdominal tenderness  Skin: Warm and dry  Musculoskeletal: No leg edema or calf tenderness  Neurologic: Awake verbal and moving all extremities    Laboratory  CBC shows white blood cell count of 6.6 hemoglobin is adequate at 15.4 basic metabolic panel is unremarkable troponin is normal    EKG interpretation  Twelve-lead EKG shows sinus rhythm 66 bpm there is no pathologic ST elevation or depression or ectopy S wave is noted in lead I    Radiology  DX-CHEST-PORTABLE (1 VIEW)   Final Result      No acute cardiopulmonary disease evident.      NM-CARDIAC STRESS TEST    (Results Pending) "         MEDICAL DECISION MAKING and DISPOSITION  In the emergency department the patient was given aspirin.  I reviewed the findings with him.  The patient is quite however he does have risk factors of hypertension and high cholesterol and he is overweight.  I have reviewed the case with the hospitalist the patient will be admitted for further evaluation and treatment    IMPRESSION  1.  Chest pain  2.  Hypertension         Electronically signed by: Rishabh Bashir, 1/26/2019 9:34 PM

## 2019-01-27 NOTE — ED TRIAGE NOTES
Pt comes in c/o chest pain that started around neetu time  On and off since  Having L arm discomfort as well  Numbness to L wrist and palm area   Denies injury   Varies in intensities

## 2019-01-27 NOTE — CARE PLAN
Problem: Communication  Goal: The ability to communicate needs accurately and effectively will improve  Outcome: PROGRESSING AS EXPECTED    Intervention: [facility] patient and significant other/support system to call light to alert staff of needs  Patient oriented to call light system and all relevant hospital policies. Call light within reach and used appropriately when in need of assistance.        Problem: Infection  Goal: Will remain free from infection  Outcome: PROGRESSING AS EXPECTED    Intervention: Implement standard precautions and perform hand washing before and after patient contact  Standard precautions and hand hygiene practices implemented before and after patient room entry. Gloves worn during times of patient contact.        Problem: Knowledge Deficit  Goal: Knowledge of disease process/condition, treatment plan, diagnostic tests, and medications will improve  Outcome: PROGRESSING AS EXPECTED    Intervention: Assess knowledge level of disease process/condition, treatment plan, diagnostic tests, and medications  Patient's knowledge of plan of care (NPO at midnight), diagnostics (stress test in morning), and medications regularly assessed. RN answers patient questions appropriately, education provided. Patient verbalizes better understanding of their condition.

## 2019-01-27 NOTE — CARE PLAN
Problem: Communication  Goal: The ability to communicate needs accurately and effectively will improve  Outcome: PROGRESSING AS EXPECTED  POC discussed including stress test, NPO, troponins, poss d/c. Questions/concerns addressed. Pt verbalizes understanding of POC.    Problem: Pain Management  Goal: Pain level will decrease to patient's comfort goal  Outcome: PROGRESSING AS EXPECTED  Pt states no pain at this time. Comfort measures discussed with pt including non pharmacological measures. Pt verbalizes understanding of pain management.

## 2019-01-27 NOTE — DISCHARGE INSTRUCTIONS
Discharge Instructions    Discharged to home by car with relative. Discharged via walking, hospital escort: Refused.  Special equipment needed: Not Applicable    Be sure to schedule a follow-up appointment with your primary care doctor or any specialists as instructed.     Discharge Plan:   Diet Plan: Discussed  Activity Level: Discussed  Smoking Cessation Offered: Patient Refused  Confirmed Follow up Appointment: Appointment Scheduled  Confirmed Symptoms Management: Discussed  Medication Reconciliation Updated: Yes  Influenza Vaccine Indication: Patient Refuses    I understand that a diet low in cholesterol, fat, and sodium is recommended for good health. Unless I have been given specific instructions below for another diet, I accept this instruction as my diet prescription.   Other diet: Regular, minimize caffeine intake if possible    Special Instructions: None    · Is patient discharged on Warfarin / Coumadin?   No     Chest Pain, Nonspecific  It is often hard to give a specific diagnosis for the cause of chest pain. There is always a chance that your pain could be related to something serious, like a heart attack or a blood clot in the lungs. You need to follow up with your caregiver for further evaluation. More lab tests or other studies such as X-rays, electrocardiography, stress testing, or cardiac imaging may be needed to find the cause of your pain.  Most of the time, nonspecific chest pain improves within 2 to 3 days with rest and mild pain medicine. For the next few days, avoid physical exertion or activities that bring on pain. Do not smoke. Avoid drinking alcohol. Call your caregiver for routine follow-up as advised.   SEEK IMMEDIATE MEDICAL CARE IF:  · You develop increased chest pain or pain that radiates to the arm, neck, jaw, back, or abdomen.   · You develop shortness of breath, increased coughing, or you start coughing up blood.   · You have severe back or abdominal pain, nausea, or vomiting.    · You develop severe weakness, fainting, fever, or chills.   Document Released: 12/18/2006 Document Revised: 03/11/2013 Document Reviewed: 06/06/2008  ExitCare® Patient Information ©2013 [employer].      Pharmacologic Stress Electrocardiogram  A pharmacologic stress electrocardiogram is a heart (cardiac) test that uses nuclear imaging to evaluate the blood supply to your heart. This test may also be called a pharmacologic stress electrocardiography. Pharmacologic means that a medicine is used to increase your heart rate and blood pressure.   This stress test is done to find areas of poor blood flow to the heart by determining the extent of coronary artery disease (CAD). Some people exercise on a treadmill, which naturally increases the blood flow to the heart. For those people unable to exercise on a treadmill, a medicine is used. This medicine stimulates your heart and will cause your heart to beat harder and more quickly, as if you were exercising.   Pharmacologic stress tests can help determine:  · The adequacy of blood flow to your heart during increased levels of activity in order to clear you for discharge home.  · The extent of coronary artery blockage caused by CAD.  · Your prognosis if you have suffered a heart attack.  · The effectiveness of cardiac procedures done, such as an angioplasty, which can increase the circulation in your coronary arteries.  · Causes of chest pain or pressure.  LET YOUR HEALTH CARE PROVIDER KNOW ABOUT:  · Any allergies you have.  · All medicines you are taking, including vitamins, herbs, eye drops, creams, and over-the-counter medicines.  · Previous problems you or members of your family have had with the use of anesthetics.  · Any blood disorders you have.  · Previous surgeries you have had.  · Medical conditions you have.  · Possibility of pregnancy, if this applies.  · If you are currently breastfeeding.  RISKS AND COMPLICATIONS  Generally, this is a safe procedure.  However, as with any procedure, complications can occur. Possible complications include:  · You develop pain or pressure in the following areas:  ¨ Chest.  ¨ Jaw or neck.  ¨ Between your shoulder blades.  ¨ Radiating down your left arm.  · Headache.  · Dizziness or light-headedness.  · Shortness of breath.  · Increased or irregular heartbeat.  · Low blood pressure.  · Nausea or vomiting.  · Flushing.  · Redness going up the arm and slight pain during injection of medicine.  · Heart attack (rare).  BEFORE THE PROCEDURE   · Avoid all forms of caffeine for 24 hours before your test or as directed by your health care provider. This includes coffee, tea (even decaffeinated tea), caffeinated sodas, chocolate, cocoa, and certain pain medicines.  · Follow your health care provider's instructions regarding eating and drinking before the test.  · Take your medicines as directed at regular times with water unless instructed otherwise. Exceptions may include:  ¨ If you have diabetes, ask how you are to take your insulin or pills. It is common to adjust insulin dosing the morning of the test.  ¨ If you are taking beta-blocker medicines, it is important to talk to your health care provider about these medicines well before the date of your test. Taking beta-blocker medicines may interfere with the test. In some cases, these medicines need to be changed or stopped 24 hours or more before the test.  ¨ If you wear a nitroglycerin patch, it may need to be removed prior to the test. Ask your health care provider if the patch should be removed before the test.  ¨ If you use an inhaler for any breathing condition, bring it with you to the test.  · If you are an outpatient, bring a snack so you can eat right after the stress phase of the test.  · Do not smoke for 4 hours prior to the test or as directed by your health care provider.  · Do not apply lotions, powders, creams, or oils on your chest prior to the test.  · Wear comfortable  shoes and clothing.  Let your health care provider know if you were unable to complete or follow the preparations for your test.  PROCEDURE   · Multiple patches (electrodes) will be put on your chest. If needed, small areas of your chest may be shaved to get better contact with the electrodes. Once the electrodes are attached to your body, multiple wires will be attached to the electrodes, and your heart rate will be monitored.  · An IV access will be started. A nuclear trace (isotope) is given. The isotope may be given intravenously, or it may be swallowed. Nuclear refers to several types of radioactive isotopes, and the nuclear isotope lights up the arteries so that the nuclear images are clear. The isotope is absorbed by your body. This results in low radiation exposure.  · A resting nuclear image is taken to show how your heart functions at rest.  · A medicine is given through the IV access.  · A second scan is done about 1 hour after the medicine injection and determines how your heart functions under stress.  · During this stress phase, you will be connected to an electrocardiogram machine. Your blood pressure and oxygen levels will be monitored.  AFTER THE PROCEDURE   · Your heart rate and blood pressure will be monitored after the test.  · You may return to your normal schedule, including diet, activities, and medicines, unless your health care provider tells you otherwise.     This information is not intended to replace advice given to you by your health care provider. Make sure you discuss any questions you have with your health care provider.     Document Released: 05/06/2010 Document Revised: 12/23/2014 Document Reviewed: 08/25/2014  [employer] Interactive Patient Education ©2016 [employer]    Depression / Suicide Risk    As you are discharged from this [facility] facility, it is important to learn how to keep safe from harming yourself.    Recognize the warning signs:  · Abrupt changes in  personality, positive or negative- including increase in energy   · Giving away possessions  · Change in eating patterns- significant weight changes-  positive or negative  · Change in sleeping patterns- unable to sleep or sleeping all the time   · Unwillingness or inability to communicate  · Depression  · Unusual sadness, discouragement and loneliness  · Talk of wanting to die  · Neglect of personal appearance   · Rebelliousness- reckless behavior  · Withdrawal from people/activities they love  · Confusion- inability to concentrate     If you or a loved one observes any of these behaviors or has concerns about self-harm, here's what you can do:  · Talk about it- your feelings and reasons for harming yourself  · Remove any means that you might use to hurt yourself (examples: pills, rope, extension cords, firearm)  · Get professional help from the community (Mental Health, Substance Abuse, psychological counseling)  · Do not be alone:Call your Safe Contact- someone whom you trust who will be there for you.  · Call your local CRISIS HOTLINE 026-7799 or 769-748-0276  · Call your local Children's Mobile Crisis Response Team [place] (565) 761-3576 or www.ZingCheckout  · Call the toll free National Suicide Prevention Hotlines   · National Suicide Prevention Lifeline 466-552-JUVZ (7362)  · National Hope Line Network 800-SUICIDE (377-6999)

## 2019-01-27 NOTE — ASSESSMENT & PLAN NOTE
In the setting of poorly controlled hypertension and possibly hyperlipidemia.  Patient works for the [employer] and needs to get a stress test for clearance before he would be allowed back to work.  Admit for observation, telemetry monitoring, serial troponin trend, EKG  Treadmill with myocardial perfusion imaging study in the morning.

## 2019-01-27 NOTE — PROGRESS NOTES
Pt back up from stress test. MD rounded at bedside. Stress test negative, Ok to d/c per MD. IV and tele removed. D/c instructions provided and explained to pt and significant other. Questions/concerns addressed.  Pt ambulated out with steady gait. All belongings with pt.

## 2019-01-27 NOTE — H&P
Hospital Medicine History & Physical Note    Date of Service  1/26/2019    Primary Care Physician  Rocco Hays M.D.    Consultants  None.    Code Status  Full code.    Chief Complaint  Chest pain.    History of Presenting Illness  33 y.o. male who presented 1/26/2019 with chest pain. Several months ago he was diagnosed with hypertension that really has not been well controlled and despite being on Cozaar 50 mg daily his blood pressures still been as high as 150s.  He has been having chest pains of varying types sometimes pressure-like sometimes stabbing never occurring with exertion.  He does work in the [employer] for [employer].  He denies any shortness of breath.  He does chew tobacco but has been cutting back on this and used to drink heavily on the weekends but now has only about 2 or 3 beers per week.    Review of Systems  Review of Systems   Constitutional: Negative.  Negative for chills, fever, malaise/fatigue and weight loss.   HENT: Negative.    Respiratory: Negative.  Negative for cough and shortness of breath.    Cardiovascular: Positive for chest pain. Negative for leg swelling.   Gastrointestinal: Negative.  Negative for abdominal pain, nausea and vomiting.   Genitourinary: Negative.  Negative for dysuria and flank pain.   Musculoskeletal: Negative.  Negative for back pain and myalgias.   Neurological: Negative.  Negative for dizziness, loss of consciousness and weakness.   Endo/Heme/Allergies: Negative.  Does not bruise/bleed easily.   Psychiatric/Behavioral: Negative.  Negative for depression. The patient is not nervous/anxious.    All other systems reviewed and are negative.      Past Medical History   has a past medical history of Allergy; Asthma; Hyperlipidemia; Hypertension; and Reactive airway disease with wheezing.    Surgical History   has no past surgical history on file.   No surgeries.  Family History  family history includes Cancer in his maternal grandfather;  Hypertension in his father, paternal grandfather, and paternal uncle; No Known Problems in his mother and sister; Stroke (age of onset: 50) in his paternal grandfather.     Social History   reports that he quit smoking about 7 years ago. His smoking use included Cigarettes. He quit smokeless tobacco use today. His smokeless tobacco use included Chew. He reports that he drinks about 1.8 oz of alcohol per week . He reports that he does not use drugs. He has smoked marijuana in the past.    Allergies  No Known Allergies    Medications  Prior to Admission Medications   Prescriptions Last Dose Informant Patient Reported? Taking?   Albuterol Sulfate 108 (90 Base) MCG/ACT AEROSOL POWDER, BREATH ACTIVATED prn  No No   Sig: Inhale 1-2 Puffs by mouth every 6 hours as needed (shortness of breath).   Cetirizine HCl (ZYRTEC ALLERGY PO) prn  Yes No   Sig: Take  by mouth.   losartan (COZAAR) 50 MG Tab   No No   Sig: Take 1 Tab by mouth every day.      Facility-Administered Medications: None       Physical Exam  Temp:  [36.7 °C (98.1 °F)-36.8 °C (98.3 °F)] 36.8 °C (98.3 °F)  Pulse:  [58-70] 58  Resp:  [16] 16  BP: (136-145)/() 145/91  SpO2:  [94 %-95 %] 95 %    Physical Exam    Laboratory:  Recent Labs      01/26/19 2038   WBC  6.6   RBC  4.69*   HEMOGLOBIN  15.4   HEMATOCRIT  44.8   MCV  95.5   MCH  32.8   MCHC  34.4   RDW  42.5   PLATELETCT  305   MPV  10.0     Recent Labs      01/26/19 2038   SODIUM  135   POTASSIUM  3.6   CHLORIDE  102   CO2  23   GLUCOSE  99   BUN  14   CREATININE  0.96   CALCIUM  9.4     Recent Labs      01/26/19 2038   GLUCOSE  99                 Recent Labs      01/26/19 2038   TROPONINI  <0.02       Urinalysis:    No results found     Imaging:  DX-CHEST-PORTABLE (1 VIEW)   Final Result      No acute cardiopulmonary disease evident.      NM-CARDIAC STRESS TEST    (Results Pending)         Assessment/Plan:  I anticipate this patient is appropriate for observation status at this  time.    Hyperlipidemia- (present on admission)   Assessment & Plan    Not meeting criteria for statin therapy.  Discussed diet/excercise, lifestyle changes.     Tobacco abuse- (present on admission)   Assessment & Plan    Patient chews tobacco. Discussed cessation.     Chest pain- (present on admission)   Assessment & Plan    In the setting of poorly controlled hypertension and possibly hyperlipidemia.  Patient works for the [employer] and needs to get a stress test for clearance before he would be allowed back to work.  Admit for observation, telemetry monitoring, serial troponin trend, EKG  Treadmill with myocardial perfusion imaging study in the morning.     Hypertension- (present on admission)   Assessment & Plan    Recently diagnosed and poorly controlled.  Continue losartan, increase to 100mg daily.           VTE prophylaxis: scd

## 2019-01-27 NOTE — PROGRESS NOTES
Assessment completed. Pt AAOx4. No c/o chest pain at this time. Pt c/o some numbness to left hand, reports it has been consistent since admission. No c/o n/v, dizziness. Pt bradycardic on tele, not symptomatic. Stress test scheduled for 0820. Pt upself to bathroom. Pt states no needs at this time, call light in reach.

## 2019-01-27 NOTE — PROGRESS NOTES
Patient converted to chemical-resting heart-rate low 40's and patient stated he was not sure if he had a beta-blocker this morning.     Nursing care plan includes knowledge deficit, potential for discomfort, potential for compromised cardiac output. POC includes teaching, comfort measures and reassurance, and access to code cart, cardiology stand by and availability of rapid response team. Pt verbalizes good understanding of benefits and risks of pharmacological cardiac stress test. Informed consent obtained. Lexiscan given, pt developed the following symptoms SOB, heavy arms and heavy legs. Patient denies cardiac symptoms. VS stable, major symptoms resolved. To waiting room, caffeinated fluids and/or snacks given. Nursing goals met.

## 2019-01-27 NOTE — PROGRESS NOTES
· 2 RN skin check complete with FADY Atkinson.  · Devices in place TeleBox.  · Skin assessed under devices yes.  · Confirmed pressure ulcers found on N/A.  · New potential pressure ulcers noted on N/A. Wound consult placed and wound reported.  · The following interventions in place patient turns self in bed, is ambulatory and frequently mobilizes

## 2019-01-27 NOTE — PROGRESS NOTES
Telemetry Shift Summary    Rhythm SB  HR Range 40s-50s  Ectopy none  Measurements 0.20/0.08/0.44        Normal Values  Rhythm SR  HR Range    Measurements 0.12-0.20 / 0.06-0.10  / 0.30-0.52

## 2019-01-27 NOTE — PROGRESS NOTES
"Patient arrives to [facility] with significant other. Ambulatory to bed.  Admit profile and assessment completed. Went over POC with patient. Home medications sent home with girlfriend.  Patient denies any pain besides left intermittent chest pressure which does not impact activity, but rather remains as more of a \"nagging\" feeling. Pt verbalizes numbness to LUE, but is able to feel sensation upon palpation, dexterity remains in tact.  "

## 2019-02-01 ENCOUNTER — OFFICE VISIT (OUTPATIENT)
Dept: MEDICAL GROUP | Facility: MEDICAL CENTER | Age: 34
End: 2019-02-01
Payer: COMMERCIAL

## 2019-02-01 VITALS
TEMPERATURE: 96.4 F | SYSTOLIC BLOOD PRESSURE: 136 MMHG | DIASTOLIC BLOOD PRESSURE: 74 MMHG | OXYGEN SATURATION: 94 % | HEART RATE: 62 BPM | BODY MASS INDEX: 31.97 KG/M2 | WEIGHT: 249.12 LBS | HEIGHT: 74 IN

## 2019-02-01 DIAGNOSIS — R07.9 CHEST PAIN, UNSPECIFIED TYPE: ICD-10-CM

## 2019-02-01 DIAGNOSIS — R06.83 SNORING: ICD-10-CM

## 2019-02-01 DIAGNOSIS — I10 ESSENTIAL HYPERTENSION: ICD-10-CM

## 2019-02-01 PROCEDURE — 99214 OFFICE O/P EST MOD 30 MIN: CPT | Performed by: FAMILY MEDICINE

## 2019-02-01 RX ORDER — LOSARTAN POTASSIUM AND HYDROCHLOROTHIAZIDE 12.5; 5 MG/1; MG/1
1 TABLET ORAL DAILY
Qty: 30 TAB | Refills: 2 | Status: SHIPPED | OUTPATIENT
Start: 2019-02-01 | End: 2019-04-29 | Stop reason: SDUPTHER

## 2019-02-01 NOTE — ASSESSMENT & PLAN NOTE
Concerned about sleep apnea. Patient's wife states that he snores pretty loudly. He wakes up frequently. Sleep does seem to have improved slightly since losing about 10 lbs.

## 2019-02-01 NOTE — ASSESSMENT & PLAN NOTE
Left sided, comes and goes for the past 3 months. Sharp, sometimes throbbing. Also sometimes has radiation to the left arm with perceived numbness.   Was admitted overnight.  Has lost 10lbs over the past months. Improving his diet. Trying to reduce his chewing, down to 2 per day.   Pain seems to improve with exercise.   Grandfather  age 59 due to stroke.

## 2019-02-03 NOTE — PROGRESS NOTES
Subjective:     CC: Diagnoses of Chest pain, unspecified type, Essential hypertension, and Snoring were pertinent to this visit.    HPI: Patient is a 33 y.o. male established patient who presents today to follow up from hospital admission for chest pain.       Chest pain  Left sided, comes and goes for the past 3 months. Sharp, sometimes throbbing. Also sometimes has radiation to the left arm with perceived numbness.   Was admitted overnight and discharged on 19 after normal EKG, normal stress test and neg serial trops.  Has lost 10lbs over the past month intentionally . Improving his diet. Trying to reduce his chewing, down to 2 pouches per day.   The chest Pain seems to improve with exercise.   Grandfather  age 59 due to stroke, no other family h/o heart disease.  Of note, the patient is a  and he has had 2 work acquaintances have severe MIs leading to death for one and requiring a code and revival for another. He admits that this has worsened his anxiety regarding heart disease.     Hypertension  The patient has long standing elevated blood pressure. He is currently on Losartan 50mg daily. He continues to have elvated BP. He has been checking regularly at home and his BP is 130's-160's systolic at home, on average mid 140's systolic.     Snoring  Concerned about sleep apnea. Patient's wife states that he snores pretty loudly, although never stops breathing and does not wake up gasping. He wakes up frequently. Sleep does seem to have improved slightly since losing about 10 lbs.       Past Medical History:   Diagnosis Date   • Allergy    • Asthma    • Hyperlipidemia    • Hypertension    • Reactive airway disease with wheezing     occasional to smoke or mold       Social History   Substance Use Topics   • Smoking status: Former Smoker     Types: Cigarettes     Quit date: 3/1/2011   • Smokeless tobacco: Former User     Types: Chew     Quit date: 2019      Comment: discussed   • Alcohol use  "1.8 oz/week     3 Cans of beer per week      Comment: was drinking heavy on weekends now 2-3 per week       Current Outpatient Prescriptions Ordered in [facility]   Medication Sig Dispense Refill   • losartan-hydrochlorothiazide (HYZAAR) 50-12.5 MG per tablet Take 1 Tab by mouth every day. 30 Tab 2   • multivitamin (THERAGRAN) Tab Take 3 Tabs by mouth every day.     • Omega-3 Fatty Acids (FISH OIL) 1000 MG Cap capsule Take 2,000 mg by mouth every day.     • Flaxseed, Linseed, (FLAXSEED OIL PO) Take 1 Cap by mouth every day.     • aspirin EC (ECOTRIN) 81 MG Tablet Delayed Response Take 81 mg by mouth as needed (For chest pain).     • cetirizine (ZYRTEC) 10 MG Tab Take 10 mg by mouth as needed for Allergies.     • losartan (COZAAR) 50 MG Tab Take 1 Tab by mouth every day. 90 Tab 1     No current Epic-ordered facility-administered medications on file.        Allergies:  Patient has no known allergies.      ROS:    Eyes: no changes in vision  Pulm: no sob, no cough      Objective:       Exam:  /74 (BP Location: Right arm, Patient Position: Sitting, BP Cuff Size: Adult long)   Pulse 62   Temp (!) 35.8 °C (96.4 °F) (Temporal)   Ht 1.88 m (6' 2.02\")   Wt 113 kg (249 lb 1.9 oz)   SpO2 94%   BMI 31.97 kg/m²  Body mass index is 31.97 kg/m².    General: Normal appearing. No distress.  HEENT: conjunctiva clear, lids without ptosis, pupils equal  and reactive to light, ears normal shape and contour, canals are clear bilaterally, TMs clear, oropharynx is without erythema, edema or exudates.   Neck: Supple without masses. Thyroid is not enlarged. Normal ROM  Pulmonary: Clear to ausculation.  Normal effort. No rales, ronchi, or wheezing.  Cardiovascular: Regular rate and rhythm, no murmur. No LE edema  Neurologic: Grossly normal, no focal deficits  Lymph: No cervical or supraclavicular lymph nodes are palpable  Skin: Warm and dry.  No obvious lesions.  Musculoskeletal: Normal gait and station.  Psych: Normal mood and affect. " Alert and oriented x3. Judgment and insight is normal.    Labs and imagin19 Results reviewed and discussed with the patient, questions answered.    Assessment & Plan:     33 y.o. male with the following -     1. Chest pain, unspecified type  Chronic problem, uncontrolled. Added HCTZ as his BP is still chronically elevated. If he continues to have palpitations and chest pain, may consider cards referral for event monitor.     2. Essential hypertension  Chronic problem, uncontrolled. See above.   - losartan-hydrochlorothiazide (HYZAAR) 50-12.5 MG per tablet; Take 1 Tab by mouth every day.  Dispense: 30 Tab; Refill: 2    3. Snoring  New problem, seems to be improving with weight loss. Will hold off on sleep study for now.       Return in about 4 weeks (around 3/1/2019).    Please note that this dictation was created using voice recognition software. I have made every reasonable attempt to correct obvious errors, but I expect that there are errors of grammar and possibly content that I did not discover before finalizing the note.

## 2019-03-04 ENCOUNTER — OFFICE VISIT (OUTPATIENT)
Dept: MEDICAL GROUP | Facility: MEDICAL CENTER | Age: 34
End: 2019-03-04
Payer: COMMERCIAL

## 2019-03-04 VITALS
HEART RATE: 66 BPM | TEMPERATURE: 98.1 F | WEIGHT: 248 LBS | HEIGHT: 74 IN | OXYGEN SATURATION: 94 % | RESPIRATION RATE: 16 BRPM | DIASTOLIC BLOOD PRESSURE: 68 MMHG | BODY MASS INDEX: 31.83 KG/M2 | SYSTOLIC BLOOD PRESSURE: 136 MMHG

## 2019-03-04 DIAGNOSIS — E78.5 HYPERLIPIDEMIA, UNSPECIFIED HYPERLIPIDEMIA TYPE: ICD-10-CM

## 2019-03-04 DIAGNOSIS — I10 ESSENTIAL HYPERTENSION: ICD-10-CM

## 2019-03-04 DIAGNOSIS — R07.9 CHEST PAIN, UNSPECIFIED TYPE: ICD-10-CM

## 2019-03-04 DIAGNOSIS — Z72.0 TOBACCO ABUSE: ICD-10-CM

## 2019-03-04 PROCEDURE — 99214 OFFICE O/P EST MOD 30 MIN: CPT | Performed by: FAMILY MEDICINE

## 2019-03-05 NOTE — PROGRESS NOTES
[facility]  Progress Note  Established Patient    Subjective:   Vaibhav Guillermo is a 33 y.o. male here today with a chief complaint of HTN. The patient is alone.     Chest pain  This issue has resolved.  The patient had a normal stress test.    Hyperlipidemia  Patient has dyslipidemia.  He is actively working on diet and exercise.    Hypertension  Patient's blood pressure is at goal on his current medication regimen.    Tobacco abuse  Patient continues to smoke socially.      Current Outpatient Prescriptions on File Prior to Visit   Medication Sig Dispense Refill   • losartan-hydrochlorothiazide (HYZAAR) 50-12.5 MG per tablet Take 1 Tab by mouth every day. 30 Tab 2   • multivitamin (THERAGRAN) Tab Take 3 Tabs by mouth every day.     • Omega-3 Fatty Acids (FISH OIL) 1000 MG Cap capsule Take 2,000 mg by mouth every day.     • Flaxseed, Linseed, (FLAXSEED OIL PO) Take 1 Cap by mouth every day.     • aspirin EC (ECOTRIN) 81 MG Tablet Delayed Response Take 81 mg by mouth as needed (For chest pain).     • cetirizine (ZYRTEC) 10 MG Tab Take 10 mg by mouth as needed for Allergies.       No current facility-administered medications on file prior to visit.        Past Medical History:   Diagnosis Date   • Allergy    • Asthma    • Hyperlipidemia    • Hypertension    • Reactive airway disease with wheezing     occasional to smoke or mold       Allergies: Patient has no known allergies.    Surgical History:  has no past surgical history on file.    Family History: family history includes Cancer in his maternal grandfather; Hypertension in his father, paternal grandfather, and paternal uncle; No Known Problems in his mother and sister; Stroke (age of onset: 50) in his paternal grandfather.    Social History:  reports that he quit smoking about 8 years ago. His smoking use included Cigarettes. He quit smokeless tobacco use about 5 weeks ago. His smokeless tobacco use included Chew. He reports that he drinks about 1.8  "oz of alcohol per week . He reports that he does not use drugs.    ROS: no CP or SOB.        Objective:     Vitals:    03/04/19 1423   BP: 136/68   BP Location: Left arm   Patient Position: Sitting   BP Cuff Size: Large adult   Pulse: 66   Resp: 16   Temp: 36.7 °C (98.1 °F)   TempSrc: Temporal   SpO2: 94%   Weight: 112.5 kg (248 lb)   Height: 1.88 m (6' 2\")       Physical Exam:  General: alert in no apparent distress.   Cardio: regular rate and rhythm, no murmurs, rubs or gallops.   Resp: CTAB no w/r/r.         Assessment and Plan:     1. Hyperlipidemia, unspecified hyperlipidemia type  - discussed diet and exercise.     2. Essential hypertension  - continue current regimen.   - Basic Metabolic Panel; Future    3. Chest pain, unspecified type  - resolved.     4. Tobacco abuse  - emphasized importance of cessation.         Followup: Return in about 6 months (around 9/4/2019), or if symptoms worsen or fail to improve.         "

## 2019-04-29 DIAGNOSIS — I10 ESSENTIAL HYPERTENSION: ICD-10-CM

## 2019-04-29 RX ORDER — LOSARTAN POTASSIUM AND HYDROCHLOROTHIAZIDE 12.5; 5 MG/1; MG/1
1 TABLET ORAL DAILY
Qty: 90 TAB | Refills: 4 | Status: SHIPPED | OUTPATIENT
Start: 2019-04-29 | End: 2019-05-01 | Stop reason: SDUPTHER

## 2019-05-01 RX ORDER — LOSARTAN POTASSIUM AND HYDROCHLOROTHIAZIDE 12.5; 5 MG/1; MG/1
1 TABLET ORAL DAILY
Qty: 90 TAB | Refills: 4 | Status: SHIPPED | OUTPATIENT
Start: 2019-05-01 | End: 2019-05-02 | Stop reason: SDUPTHER

## 2019-05-02 RX ORDER — LOSARTAN POTASSIUM AND HYDROCHLOROTHIAZIDE 12.5; 5 MG/1; MG/1
1 TABLET ORAL DAILY
Qty: 90 TAB | Refills: 4 | Status: SHIPPED | OUTPATIENT
Start: 2019-05-02 | End: 2020-04-13

## 2020-04-01 ENCOUNTER — OFFICE VISIT (OUTPATIENT)
Dept: MEDICAL GROUP | Facility: MEDICAL CENTER | Age: 35
End: 2020-04-01
Payer: COMMERCIAL

## 2020-04-01 DIAGNOSIS — Z72.0 TOBACCO ABUSE: ICD-10-CM

## 2020-04-01 DIAGNOSIS — J45.909 REACTIVE AIRWAY DISEASE WITH WHEEZING: ICD-10-CM

## 2020-04-01 DIAGNOSIS — R21 RASH AND OTHER NONSPECIFIC SKIN ERUPTION: ICD-10-CM

## 2020-04-01 PROCEDURE — 99214 OFFICE O/P EST MOD 30 MIN: CPT | Mod: CR,95 | Performed by: FAMILY MEDICINE

## 2020-04-01 RX ORDER — ALBUTEROL SULFATE 90 UG/1
1-2 AEROSOL, METERED RESPIRATORY (INHALATION) EVERY 4 HOURS PRN
Qty: 1 INHALER | Refills: 6 | Status: SHIPPED | OUTPATIENT
Start: 2020-04-01 | End: 2021-06-09

## 2020-04-01 RX ORDER — FLUTICASONE PROPIONATE 50 MCG
1 SPRAY, SUSPENSION (ML) NASAL DAILY
COMMUNITY

## 2020-04-01 RX ORDER — TRIAMCINOLONE ACETONIDE 1 MG/G
OINTMENT TOPICAL
Qty: 60 G | Refills: 0 | Status: SHIPPED | OUTPATIENT
Start: 2020-04-01

## 2020-04-01 ASSESSMENT — PATIENT HEALTH QUESTIONNAIRE - PHQ9: CLINICAL INTERPRETATION OF PHQ2 SCORE: 0

## 2020-04-01 NOTE — PROGRESS NOTES
Telemedicine Visit: Established Patient     This encounter was conducted via Zoom .   Verbal consent was obtained. Patient's identity was verified.    Subjective:   CC: Cole Guillermo is a 34 y.o. male presenting for evaluation and management of:    Reactive airway disease with wheezing  The patient's reactive airway disease is well controlled with rare use of albuterol.  He states that with the winds he is requiring more albuterol and is requesting a refill.  He denies significant chest pain but does endorse some slight shortness of breath.  His symptoms are mild in severity and albuterol does help.    Rash and other nonspecific skin eruption  Patient reports a several month history of a chest rash which is pruritic but generally not too bothersome to him.  He has not really used anything to help.  It is mild in severity.  He does have a history of reactive airway disease.    Tobacco abuse  I am happy to report that the patient has quit smoking.        ROS no fever, no CP.    No Known Allergies    Current medicines (including changes today)  Current Outpatient Medications   Medication Sig Dispense Refill   • fluticasone (FLONASE) 50 MCG/ACT nasal spray Spray 1 Spray in nose every day.     • albuterol 108 (90 Base) MCG/ACT Aero Soln inhalation aerosol Inhale 1-2 Puffs by mouth every four hours as needed for Shortness of Breath. 1 Inhaler 6   • triamcinolone acetonide (KENALOG) 0.1 % Ointment Apply a thin layer to skin BID PRN rash. Do not use longer than 2 weeks 60 g 0   • losartan-hydrochlorothiazide (HYZAAR) 50-12.5 MG per tablet Take 1 Tab by mouth every day. 90 Tab 4   • multivitamin (THERAGRAN) Tab Take 3 Tabs by mouth every day.     • Omega-3 Fatty Acids (FISH OIL) 1000 MG Cap capsule Take 2,000 mg by mouth every day.     • Flaxseed, Linseed, (FLAXSEED OIL PO) Take 1 Cap by mouth every day.     • aspirin EC (ECOTRIN) 81 MG Tablet Delayed Response Take 81 mg by mouth as needed (For chest pain).     •  cetirizine (ZYRTEC) 10 MG Tab Take 10 mg by mouth as needed for Allergies.       No current facility-administered medications for this visit.        Patient Active Problem List    Diagnosis Date Noted   • Rash and other nonspecific skin eruption 04/01/2020   • Snoring 02/01/2019   • Tobacco abuse 01/26/2019   • Hyperlipidemia 01/26/2019   • Pain in both feet 08/23/2018   • Neck pain 05/09/2018   • Healthcare maintenance 11/07/2017   • Reactive airway disease with wheezing    • Hypertension        Family History   Problem Relation Age of Onset   • Stroke Paternal Grandfather 50   • Hypertension Paternal Grandfather    • Hypertension Paternal Uncle    • Cancer Maternal Grandfather         prostate, colon   • No Known Problems Mother    • Hypertension Father    • No Known Problems Sister        He  has a past medical history of Allergy, Asthma, Hyperlipidemia, Hypertension, and Reactive airway disease with wheezing.  He  has no past surgical history on file.       Objective:   Vitals obtained by patient:  Temp: 96.9    Physical Exam:  Constitutional: Alert, no distress, well-groomed.  Skin: poorly circumscribed, reddish chest skin rash.   Respiratory: Unlabored respiratory effort, no cough or audible wheeze      Assessment and Plan:   The following treatment plan was discussed:     1. Reactive airway disease with wheezing  -I informed the patient that if he is requiring this twice per week or more, he needs to let me know and we will order a PFT.  - albuterol 108 (90 Base) MCG/ACT Aero Soln inhalation aerosol; Inhale 1-2 Puffs by mouth every four hours as needed for Shortness of Breath.  Dispense: 1 Inhaler; Refill: 6    2. Rash and other nonspecific skin eruption  We will try triamcinolone.  If this fails, will try Lotrisone.  - triamcinolone acetonide (KENALOG) 0.1 % Ointment; Apply a thin layer to skin BID PRN rash. Do not use longer than 2 weeks  Dispense: 60 g; Refill: 0    3. Tobacco abuse  - commended on  cessation.       Follow-up: Return if symptoms worsen or fail to improve.

## 2020-04-01 NOTE — ASSESSMENT & PLAN NOTE
The patient's reactive airway disease is well controlled with rare use of albuterol.  He states that with the winds he is requiring more albuterol and is requesting a refill.  He denies significant chest pain but does endorse some slight shortness of breath.  His symptoms are mild in severity and albuterol does help.

## 2020-04-01 NOTE — ASSESSMENT & PLAN NOTE
Patient reports a several month history of a chest rash which is pruritic but generally not too bothersome to him.  He has not really used anything to help.  It is mild in severity.  He does have a history of reactive airway disease.

## 2020-08-07 ENCOUNTER — TELEMEDICINE (OUTPATIENT)
Dept: MEDICAL GROUP | Facility: MEDICAL CENTER | Age: 35
End: 2020-08-07
Payer: COMMERCIAL

## 2020-08-07 VITALS
DIASTOLIC BLOOD PRESSURE: 75 MMHG | HEART RATE: 53 BPM | HEIGHT: 74 IN | SYSTOLIC BLOOD PRESSURE: 135 MMHG | WEIGHT: 237 LBS | BODY MASS INDEX: 30.42 KG/M2

## 2020-08-07 DIAGNOSIS — L64.9 ANDROGENIC ALOPECIA: ICD-10-CM

## 2020-08-07 DIAGNOSIS — J45.20 MILD INTERMITTENT REACTIVE AIRWAY DISEASE WITH WHEEZING WITHOUT COMPLICATION: ICD-10-CM

## 2020-08-07 DIAGNOSIS — M72.0 DUPUYTREN CONTRACTURE: ICD-10-CM

## 2020-08-07 DIAGNOSIS — I10 ESSENTIAL HYPERTENSION: ICD-10-CM

## 2020-08-07 DIAGNOSIS — E78.5 HYPERLIPIDEMIA, UNSPECIFIED HYPERLIPIDEMIA TYPE: ICD-10-CM

## 2020-08-07 PROCEDURE — 99214 OFFICE O/P EST MOD 30 MIN: CPT | Mod: 95,CR | Performed by: FAMILY MEDICINE

## 2020-08-07 NOTE — ASSESSMENT & PLAN NOTE
Patient describes hair loss in androgenic distribution that he has noticed over the past 6 months.  It is mild.  He has not tried anything to help.

## 2020-08-07 NOTE — ASSESSMENT & PLAN NOTE
Just recently the patient noticed a fibrous band underneath the palmar aspect of the fourth digit of his left hand.  It is not really bothersome to him but he was wondering what it could be.  He thought it might be related to malaria because he did some research on the Internet that suggested this.

## 2020-08-07 NOTE — PROGRESS NOTES
Telemedicine Visit: Established Patient     This encounter was conducted via Zoom .   Verbal consent was obtained. Patient's identity was verified.    Subjective:     Vaibhav Guillermo is a 35 y.o. male presenting for evaluation and management of HTN.    Hypertension  Patient's blood pressure is at goal.  Is tolerating his medication.  He is due for labs.    Reactive airway disease with wheezing  Patient is doing very well.  He uses albuterol less than twice per week.    Hyperlipidemia  Noted on past labs.  Patient is due for follow-up labs.    Androgenic alopecia  Patient describes hair loss in androgenic distribution that he has noticed over the past 6 months.  It is mild.  He has not tried anything to help.    Dupuytren contracture  Just recently the patient noticed a fibrous band underneath the palmar aspect of the fourth digit of his left hand.  It is not really bothersome to him but he was wondering what it could be.  He thought it might be related to malaria because he did some research on the Internet that suggested this.      ROS no fever or cough.    No Known Allergies    Current medicines (including changes today)  Current Outpatient Medications   Medication Sig Dispense Refill   • losartan-hydrochlorothiazide (HYZAAR) 50-12.5 MG per tablet TAKE ONE TABLET BY MOUTH EVERY DAY 90 Tab 4   • fluticasone (FLONASE) 50 MCG/ACT nasal spray Spray 1 Spray in nose every day.     • albuterol 108 (90 Base) MCG/ACT Aero Soln inhalation aerosol Inhale 1-2 Puffs by mouth every four hours as needed for Shortness of Breath. 1 Inhaler 6   • triamcinolone acetonide (KENALOG) 0.1 % Ointment Apply a thin layer to skin BID PRN rash. Do not use longer than 2 weeks 60 g 0   • multivitamin (THERAGRAN) Tab Take 3 Tabs by mouth every day.     • Omega-3 Fatty Acids (FISH OIL) 1000 MG Cap capsule Take 2,000 mg by mouth every day.     • Flaxseed, Linseed, (FLAXSEED OIL PO) Take 1 Cap by mouth every day.       No current  "facility-administered medications for this visit.        Patient Active Problem List    Diagnosis Date Noted   • Androgenic alopecia 08/07/2020   • Dupuytren contracture 08/07/2020   • Rash and other nonspecific skin eruption 04/01/2020   • Snoring 02/01/2019   • Tobacco abuse 01/26/2019   • Hyperlipidemia 01/26/2019   • Pain in both feet 08/23/2018   • Neck pain 05/09/2018   • Healthcare maintenance 11/07/2017   • Reactive airway disease with wheezing    • Hypertension        Family History   Problem Relation Age of Onset   • Stroke Paternal Grandfather 50   • Hypertension Paternal Grandfather    • Hypertension Paternal Uncle    • Cancer Maternal Grandfather         prostate, colon   • No Known Problems Mother    • Hypertension Father    • No Known Problems Sister        He  has a past medical history of Allergy, Asthma, Hyperlipidemia, Hypertension, and Reactive airway disease with wheezing.  He  has no past surgical history on file.       Objective:   Vitals obtained by patient:  /75 (BP Location: Left arm, Patient Position: Sitting, BP Cuff Size: Adult)   Pulse (!) 53   Ht 1.88 m (6' 2\")   Wt 107.5 kg (237 lb)   BMI 30.43 kg/m²       Physical Exam:  Constitutional: Alert, no distress, well-groomed.  MSK: The patient points to the palmar aspect of the fourth digit of his left hand along with some slight dimpling in describing the hand lesion.   Skin: mild androgenic alopecia.       Assessment and Plan:     1. Hyperlipidemia, unspecified hyperlipidemia type  - Basic Metabolic Panel; Future  - Lipid Profile; Future    2. Essential hypertension  - continue current regimen.     3. Mild intermittent reactive airway disease with wheezing without complication  - PRN albuterol.    4. Androgenic alopecia  Recommended Rogaine, if no improvement, return.     5. Dupuytren contracture  Informed the patient that this is likely not related to malaria and I suspect is the early stages of Dupuytren's contracture.  We " will keep an eye on it.          Follow-up: Return if symptoms worsen or fail to improve.

## 2020-08-31 ENCOUNTER — HOSPITAL ENCOUNTER (OUTPATIENT)
Dept: LAB | Facility: MEDICAL CENTER | Age: 35
End: 2020-08-31
Attending: FAMILY MEDICINE
Payer: COMMERCIAL

## 2020-08-31 DIAGNOSIS — E78.5 HYPERLIPIDEMIA, UNSPECIFIED HYPERLIPIDEMIA TYPE: ICD-10-CM

## 2020-08-31 LAB
ANION GAP SERPL CALC-SCNC: 12 MMOL/L (ref 7–16)
BUN SERPL-MCNC: 13 MG/DL (ref 8–22)
CALCIUM SERPL-MCNC: 9.6 MG/DL (ref 8.5–10.5)
CHLORIDE SERPL-SCNC: 101 MMOL/L (ref 96–112)
CHOLEST SERPL-MCNC: 190 MG/DL (ref 100–199)
CO2 SERPL-SCNC: 23 MMOL/L (ref 20–33)
CREAT SERPL-MCNC: 0.99 MG/DL (ref 0.5–1.4)
FASTING STATUS PATIENT QL REPORTED: NORMAL
GLUCOSE SERPL-MCNC: 81 MG/DL (ref 65–99)
HDLC SERPL-MCNC: 41 MG/DL
LDLC SERPL CALC-MCNC: 124 MG/DL
POTASSIUM SERPL-SCNC: 4.3 MMOL/L (ref 3.6–5.5)
SODIUM SERPL-SCNC: 136 MMOL/L (ref 135–145)
TRIGL SERPL-MCNC: 125 MG/DL (ref 0–149)

## 2020-08-31 PROCEDURE — 36415 COLL VENOUS BLD VENIPUNCTURE: CPT

## 2020-08-31 PROCEDURE — 80048 BASIC METABOLIC PNL TOTAL CA: CPT

## 2020-08-31 PROCEDURE — 80061 LIPID PANEL: CPT

## 2020-11-04 ENCOUNTER — TELEMEDICINE (OUTPATIENT)
Dept: TELEHEALTH | Facility: TELEMEDICINE | Age: 35
End: 2020-11-04
Payer: COMMERCIAL

## 2020-11-04 DIAGNOSIS — G44.89 OTHER HEADACHE SYNDROME: ICD-10-CM

## 2020-11-04 DIAGNOSIS — J02.9 SORE THROAT: ICD-10-CM

## 2020-11-04 DIAGNOSIS — Z20.822 CLOSE EXPOSURE TO COVID-19 VIRUS: ICD-10-CM

## 2020-11-04 PROCEDURE — 99213 OFFICE O/P EST LOW 20 MIN: CPT | Mod: 95,CR,CS | Performed by: NURSE PRACTITIONER

## 2020-11-04 NOTE — PROGRESS NOTES
Virtual Visit: Established Patient   This visit was conducted via Zoom using secure and encrypted videoconferencing technology. The patient was in a private location in the state of [place].    The patient's identity was confirmed and verbal consent was obtained for this virtual visit.    Subjective:   CC: No chief complaint on file.      Vaibhav Guillermo is a 35 y.o. male presenting for evaluation and management of: Sore throat that began 4 days ago.  States that he received a call this morning a coworker that he was in the car with for over an hour over the weekend tested positive for Covid.  He initially treated his symptoms to allergies, however with progressive symptoms, is concerned about Covid.    ROS   Denies any recent fevers or chills. No nausea or vomiting. No chest pains or shortness of breath.     No Known Allergies    Current medicines (including changes today)  Current Outpatient Medications   Medication Sig Dispense Refill   • losartan-hydrochlorothiazide (HYZAAR) 50-12.5 MG per tablet TAKE ONE TABLET BY MOUTH EVERY DAY 90 Tab 4   • fluticasone (FLONASE) 50 MCG/ACT nasal spray Spray 1 Spray in nose every day.     • albuterol 108 (90 Base) MCG/ACT Aero Soln inhalation aerosol Inhale 1-2 Puffs by mouth every four hours as needed for Shortness of Breath. 1 Inhaler 6   • triamcinolone acetonide (KENALOG) 0.1 % Ointment Apply a thin layer to skin BID PRN rash. Do not use longer than 2 weeks 60 g 0   • multivitamin (THERAGRAN) Tab Take 3 Tabs by mouth every day.     • Omega-3 Fatty Acids (FISH OIL) 1000 MG Cap capsule Take 2,000 mg by mouth every day.     • Flaxseed, Linseed, (FLAXSEED OIL PO) Take 1 Cap by mouth every day.       No current facility-administered medications for this visit.        Patient Active Problem List    Diagnosis Date Noted   • Androgenic alopecia 08/07/2020   • Dupuytren contracture 08/07/2020   • Rash and other nonspecific skin eruption 04/01/2020   • Snoring 02/01/2019   •  Tobacco abuse 01/26/2019   • Hyperlipidemia 01/26/2019   • Pain in both feet 08/23/2018   • Neck pain 05/09/2018   • Healthcare maintenance 11/07/2017   • Reactive airway disease with wheezing    • Hypertension        Family History   Problem Relation Age of Onset   • Stroke Paternal Grandfather 50   • Hypertension Paternal Grandfather    • Hypertension Paternal Uncle    • Cancer Maternal Grandfather         prostate, colon   • No Known Problems Mother    • Hypertension Father    • No Known Problems Sister        He  has a past medical history of Allergy, Asthma, Hyperlipidemia, Hypertension, and Reactive airway disease with wheezing.  He  has no past surgical history on file.       Objective:   There were no vitals taken for this visit.    Physical Exam:  Constitutional: Alert, no distress, well-groomed.  Skin: No rashes in visible areas.  Eye: Round. Conjunctiva clear, lids normal. No icterus.   ENMT: Lips pink without lesions, good dentition, moist mucous membranes. Phonation normal.  Neck: No masses, no thyromegaly. Moves freely without pain.  Respiratory: Unlabored respiratory effort, no cough or audible wheeze  Psych: Alert and oriented x3, normal affect and mood.       Assessment and Plan:   The following treatment plan was discussed:     1. Sore throat  - COVID/SARS COV-2 PCR; Future    2. Other headache syndrome  - COVID/SARS COV-2 PCR; Future    3. Close exposure to COVID-19 virus  - COVID/SARS COV-2 PCR; Future    Will obtain COVID testing.  Advised to home isolate until test results return.  Supportive care options also discussed, to include alternating Tylenol and Advil as well as use of ice, heat, and massage.  Differential diagnosis, natural history, and indications for immediate follow-up were discussed.     Return to urgent care clinic or PCP if current symptoms do not improve and/or worsening symptoms occur. Advised of signs and symptoms which would warrant further evaluation and /or emergent  evaluation in ER.  All questions answered and the patient agrees to the plan of care.       Please note that this dictation was created using voice recognition software. I have made every reasonable attempt to correct obvious errors, but I expect that there may be errors of grammar and possibly content that I did not discover before finalizing the note.

## 2020-11-05 ENCOUNTER — HOSPITAL ENCOUNTER (OUTPATIENT)
Dept: LAB | Facility: MEDICAL CENTER | Age: 35
End: 2020-11-05
Attending: NURSE PRACTITIONER
Payer: COMMERCIAL

## 2020-11-05 DIAGNOSIS — Z20.822 CLOSE EXPOSURE TO COVID-19 VIRUS: ICD-10-CM

## 2020-11-05 DIAGNOSIS — G44.89 OTHER HEADACHE SYNDROME: ICD-10-CM

## 2020-11-05 DIAGNOSIS — J02.9 SORE THROAT: ICD-10-CM

## 2020-11-05 PROCEDURE — U0003 INFECTIOUS AGENT DETECTION BY NUCLEIC ACID (DNA OR RNA); SEVERE ACUTE RESPIRATORY SYNDROME CORONAVIRUS 2 (SARS-COV-2) (CORONAVIRUS DISEASE [COVID-19]), AMPLIFIED PROBE TECHNIQUE, MAKING USE OF HIGH THROUGHPUT TECHNOLOGIES AS DESCRIBED BY CMS-2020-01-R: HCPCS

## 2020-11-05 PROCEDURE — C9803 HOPD COVID-19 SPEC COLLECT: HCPCS

## 2020-11-06 LAB
COVID ORDER STATUS COVID19: NORMAL
SARS-COV-2 RNA RESP QL NAA+PROBE: NOTDETECTED
SPECIMEN SOURCE: NORMAL

## 2021-03-30 ENCOUNTER — TELEMEDICINE (OUTPATIENT)
Dept: MEDICAL GROUP | Facility: MEDICAL CENTER | Age: 36
End: 2021-03-30
Payer: COMMERCIAL

## 2021-03-30 VITALS — HEART RATE: 84 BPM | BODY MASS INDEX: 30.67 KG/M2 | HEIGHT: 74 IN | WEIGHT: 239 LBS

## 2021-03-30 DIAGNOSIS — F41.9 ANXIETY: ICD-10-CM

## 2021-03-30 DIAGNOSIS — I10 ESSENTIAL HYPERTENSION: ICD-10-CM

## 2021-03-30 PROCEDURE — 99214 OFFICE O/P EST MOD 30 MIN: CPT | Mod: 95 | Performed by: FAMILY MEDICINE

## 2021-03-30 RX ORDER — SERTRALINE HYDROCHLORIDE 25 MG/1
25 TABLET, FILM COATED ORAL DAILY
Qty: 90 TABLET | Refills: 0 | Status: SHIPPED | OUTPATIENT
Start: 2021-03-30 | End: 2021-06-09

## 2021-03-30 RX ORDER — CETIRIZINE HYDROCHLORIDE 10 MG/1
10 TABLET ORAL DAILY
COMMUNITY

## 2021-03-30 ASSESSMENT — ANXIETY QUESTIONNAIRES
1. FEELING NERVOUS, ANXIOUS, OR ON EDGE: NEARLY EVERY DAY
5. BEING SO RESTLESS THAT IT IS HARD TO SIT STILL: NEARLY EVERY DAY
2. NOT BEING ABLE TO STOP OR CONTROL WORRYING: MORE THAN HALF THE DAYS
4. TROUBLE RELAXING: NEARLY EVERY DAY
GAD7 TOTAL SCORE: 20
3. WORRYING TOO MUCH ABOUT DIFFERENT THINGS: NEARLY EVERY DAY
6. BECOMING EASILY ANNOYED OR IRRITABLE: NEARLY EVERY DAY
7. FEELING AFRAID AS IF SOMETHING AWFUL MIGHT HAPPEN: NEARLY EVERY DAY

## 2021-03-30 ASSESSMENT — PATIENT HEALTH QUESTIONNAIRE - PHQ9: CLINICAL INTERPRETATION OF PHQ2 SCORE: 0

## 2021-03-30 NOTE — ASSESSMENT & PLAN NOTE
The patient has been checking his blood pressure at home and it is predominantly in the high 140s.  He is taking his losartan/hydrochlorothiazide.  He is unsure if he is significantly snoring.

## 2021-03-30 NOTE — PROGRESS NOTES
Telemedicine Visit: Established Patient     This encounter was conducted via Zoom.   Verbal consent was obtained. Patient's identity was verified.    Subjective:     Vaibhav Guillermo is a 35 y.o. male presenting for evaluation and management of hypertension.    Hypertension  The patient has been checking his blood pressure at home and it is predominantly in the high 140s.  He is taking his losartan/hydrochlorothiazide.  He is unsure if he is significantly snoring.    Anxiety  Patient states he is always had a little bit of anxiety.  He used to smoke marijuana and this helped but he stopped smoking this altogether about 4 weeks ago.  Ever since stopping, the patient describes ruminations with intermittent poor sleep and flight of ideas.      ROS no chest pain, dizziness or lightheadedness.    No Known Allergies    Current medicines (including changes today)  Current Outpatient Medications   Medication Sig Dispense Refill   • cetirizine (ZYRTEC) 10 MG Tab Take 10 mg by mouth every day.     • sertraline (ZOLOFT) 25 MG tablet Take 1 tablet by mouth every day. 90 tablet 0   • losartan-hydrochlorothiazide (HYZAAR) 50-12.5 MG per tablet TAKE ONE TABLET BY MOUTH EVERY DAY 90 Tab 4   • fluticasone (FLONASE) 50 MCG/ACT nasal spray Spray 1 Spray in nose every day.     • albuterol 108 (90 Base) MCG/ACT Aero Soln inhalation aerosol Inhale 1-2 Puffs by mouth every four hours as needed for Shortness of Breath. 1 Inhaler 6   • triamcinolone acetonide (KENALOG) 0.1 % Ointment Apply a thin layer to skin BID PRN rash. Do not use longer than 2 weeks 60 g 0   • multivitamin (THERAGRAN) Tab Take 3 Tabs by mouth every day.     • Flaxseed, Linseed, (FLAXSEED OIL PO) Take 1 Cap by mouth every day.       No current facility-administered medications for this visit.       Patient Active Problem List    Diagnosis Date Noted   • Anxiety 03/30/2021   • Androgenic alopecia 08/07/2020   • Dupuytren contracture 08/07/2020   • Rash and other  "nonspecific skin eruption 04/01/2020   • Snoring 02/01/2019   • Tobacco abuse 01/26/2019   • Hyperlipidemia 01/26/2019   • Pain in both feet 08/23/2018   • Neck pain 05/09/2018   • Healthcare maintenance 11/07/2017   • Reactive airway disease with wheezing    • Hypertension        Family History   Problem Relation Age of Onset   • Stroke Paternal Grandfather 50   • Hypertension Paternal Grandfather    • Hypertension Paternal Uncle    • Cancer Maternal Grandfather         prostate, colon   • No Known Problems Mother    • Hypertension Father    • No Known Problems Sister        He  has a past medical history of Allergy, Asthma, Hyperlipidemia, Hypertension, and Reactive airway disease with wheezing.  He  has no past surgical history on file.       Objective:   Vitals obtained by patient:  Pulse 84   Ht 1.88 m (6' 2\")   Wt 108 kg (239 lb)   BMI 30.69 kg/m²       Physical Exam:  Constitutional: Alert, no distress, well-groomed.  Psych: normal affect.      Assessment and Plan:     1. Essential hypertension  -Continue losartan/hydrochlorothiazide.  -Discussed healthy diet and exercise.  -Encouraged continued cessation from tobacco and marijuana.  -Follow-up 6 weeks.    2. Anxiety  We will start Zoloft and keep him on it for 3 to 6 months.  I wonder if we can manage his anxiety, if his blood pressure will improve.  I believe the anxiety is an after effect of marijuana cessation superimposed on pre-existing generalized anxiety.  - sertraline (ZOLOFT) 25 MG tablet; Take 1 tablet by mouth every day.  Dispense: 90 tablet; Refill: 0          Follow-up: Return in about 6 weeks (around 5/11/2021), or if symptoms worsen or fail to improve.         "

## 2021-03-30 NOTE — ASSESSMENT & PLAN NOTE
Patient states he is always had a little bit of anxiety.  He used to smoke marijuana and this helped but he stopped smoking this altogether about 4 weeks ago.  Ever since stopping, the patient describes ruminations with intermittent poor sleep and flight of ideas.

## 2021-04-30 DIAGNOSIS — I10 ESSENTIAL HYPERTENSION: ICD-10-CM

## 2021-04-30 RX ORDER — LOSARTAN POTASSIUM AND HYDROCHLOROTHIAZIDE 12.5; 5 MG/1; MG/1
1 TABLET ORAL
Qty: 100 TABLET | Refills: 4 | Status: SHIPPED | OUTPATIENT
Start: 2021-04-30 | End: 2022-05-16

## 2021-04-30 NOTE — TELEPHONE ENCOUNTER
Was the patient seen in the last year in this department? Yes   Does patient have an active prescription for medications requested? No   Received Request Via: Patient  Pt is working off site and needs a refill to a pharmacy near his work location in [place].

## 2021-05-12 ENCOUNTER — OFFICE VISIT (OUTPATIENT)
Dept: MEDICAL GROUP | Facility: MEDICAL CENTER | Age: 36
End: 2021-05-12
Payer: COMMERCIAL

## 2021-05-12 VITALS
TEMPERATURE: 97.2 F | WEIGHT: 258.8 LBS | DIASTOLIC BLOOD PRESSURE: 86 MMHG | RESPIRATION RATE: 20 BRPM | HEIGHT: 74 IN | BODY MASS INDEX: 33.21 KG/M2 | HEART RATE: 67 BPM | OXYGEN SATURATION: 94 % | SYSTOLIC BLOOD PRESSURE: 134 MMHG

## 2021-05-12 DIAGNOSIS — I10 ESSENTIAL HYPERTENSION: ICD-10-CM

## 2021-05-12 DIAGNOSIS — Z00.00 HEALTHCARE MAINTENANCE: ICD-10-CM

## 2021-05-12 DIAGNOSIS — E78.5 HYPERLIPIDEMIA, UNSPECIFIED HYPERLIPIDEMIA TYPE: ICD-10-CM

## 2021-05-12 DIAGNOSIS — F41.9 ANXIETY: ICD-10-CM

## 2021-05-12 LAB
APPEARANCE UR: CLEAR
BILIRUB UR STRIP-MCNC: NEGATIVE MG/DL
COLOR UR AUTO: YELLOW
GLUCOSE UR STRIP.AUTO-MCNC: NEGATIVE MG/DL
KETONES UR STRIP.AUTO-MCNC: NEGATIVE MG/DL
LEUKOCYTE ESTERASE UR QL STRIP.AUTO: NEGATIVE
NITRITE UR QL STRIP.AUTO: NEGATIVE
PH UR STRIP.AUTO: 6.5 [PH] (ref 5–8)
PROT UR QL STRIP: NEGATIVE MG/DL
RBC UR QL AUTO: NEGATIVE
SP GR UR STRIP.AUTO: 1.02
UROBILINOGEN UR STRIP-MCNC: NORMAL MG/DL

## 2021-05-12 PROCEDURE — 99214 OFFICE O/P EST MOD 30 MIN: CPT | Performed by: FAMILY MEDICINE

## 2021-05-12 PROCEDURE — 81002 URINALYSIS NONAUTO W/O SCOPE: CPT | Performed by: FAMILY MEDICINE

## 2021-05-12 NOTE — PROGRESS NOTES
"[facility]  Progress Note  Established Patient    Subjective:   Vaibhav Guillermo is a 35 y.o. male here today with a chief complaint of anxiety. The patient is alone, both of us are masked.     Hyperlipidemia  Noted on previous labs.     Hypertension  Controlled with Hyzaar.    Anxiety  Significantly improved.      Current Outpatient Medications on File Prior to Visit   Medication Sig Dispense Refill   • losartan-hydrochlorothiazide (HYZAAR) 50-12.5 MG per tablet Take 1 tablet by mouth every day. 100 tablet 4   • cetirizine (ZYRTEC) 10 MG Tab Take 10 mg by mouth every day.     • sertraline (ZOLOFT) 25 MG tablet Take 1 tablet by mouth every day. 90 tablet 0   • fluticasone (FLONASE) 50 MCG/ACT nasal spray Spray 1 Spray in nose every day.     • albuterol 108 (90 Base) MCG/ACT Aero Soln inhalation aerosol Inhale 1-2 Puffs by mouth every four hours as needed for Shortness of Breath. 1 Inhaler 6   • triamcinolone acetonide (KENALOG) 0.1 % Ointment Apply a thin layer to skin BID PRN rash. Do not use longer than 2 weeks 60 g 0   • multivitamin (THERAGRAN) Tab Take 3 Tabs by mouth every day.       No current facility-administered medications on file prior to visit.          Objective:     Vitals:    05/12/21 0911   BP: 134/86   BP Location: Left arm   Patient Position: Sitting   BP Cuff Size: Large adult   Pulse: 67   Resp: 20   Temp: 36.2 °C (97.2 °F)   TempSrc: Temporal   SpO2: 94%   Weight: 117 kg (258 lb 12.8 oz)   Height: 1.88 m (6' 2\")       Physical Exam:  General: alert in no apparent distress.   Skin: No concerning skin lesions on the torso, arms or face.        Assessment and Plan:     1. Hyperlipidemia, unspecified hyperlipidemia type  - Comp Metabolic Panel; Future  - Lipid Profile; Future    2. Essential hypertension  - continue current regimen.   - Comp Metabolic Panel; Future    3. Healthcare maintenance  Patient wanted to discuss cancer screening recommendations.  He is a .  I " discussed with him the various cancer screening modalities.  Point-of-care urinalysis today demonstrates no blood in the urine.  Skin exam of the torso, arms and face is reassuring.  Recommended sun protection.  Recommended reducing saturated fat intake.    4. Anxiety  - continue zoloft.         Followup: Return in about 6 months (around 11/12/2021), or if symptoms worsen or fail to improve.

## 2021-10-19 ENCOUNTER — HOSPITAL ENCOUNTER (OUTPATIENT)
Dept: LAB | Facility: MEDICAL CENTER | Age: 36
End: 2021-10-19
Attending: FAMILY MEDICINE
Payer: COMMERCIAL

## 2021-10-19 DIAGNOSIS — E78.5 HYPERLIPIDEMIA, UNSPECIFIED HYPERLIPIDEMIA TYPE: ICD-10-CM

## 2021-10-19 DIAGNOSIS — I10 ESSENTIAL HYPERTENSION: ICD-10-CM

## 2021-10-19 LAB
ALBUMIN SERPL BCP-MCNC: 5 G/DL (ref 3.2–4.9)
ALBUMIN/GLOB SERPL: 1.7 G/DL
ALP SERPL-CCNC: 77 U/L (ref 30–99)
ALT SERPL-CCNC: 95 U/L (ref 2–50)
ANION GAP SERPL CALC-SCNC: 14 MMOL/L (ref 7–16)
AST SERPL-CCNC: 51 U/L (ref 12–45)
BILIRUB SERPL-MCNC: 0.7 MG/DL (ref 0.1–1.5)
BUN SERPL-MCNC: 12 MG/DL (ref 8–22)
CALCIUM SERPL-MCNC: 9.6 MG/DL (ref 8.5–10.5)
CHLORIDE SERPL-SCNC: 100 MMOL/L (ref 96–112)
CHOLEST SERPL-MCNC: 285 MG/DL (ref 100–199)
CO2 SERPL-SCNC: 23 MMOL/L (ref 20–33)
CREAT SERPL-MCNC: 0.82 MG/DL (ref 0.5–1.4)
FASTING STATUS PATIENT QL REPORTED: NORMAL
GLOBULIN SER CALC-MCNC: 3 G/DL (ref 1.9–3.5)
GLUCOSE SERPL-MCNC: 83 MG/DL (ref 65–99)
HDLC SERPL-MCNC: 49 MG/DL
LDLC SERPL CALC-MCNC: 195 MG/DL
POTASSIUM SERPL-SCNC: 3.9 MMOL/L (ref 3.6–5.5)
PROT SERPL-MCNC: 8 G/DL (ref 6–8.2)
SODIUM SERPL-SCNC: 137 MMOL/L (ref 135–145)
TRIGL SERPL-MCNC: 203 MG/DL (ref 0–149)

## 2021-10-19 PROCEDURE — 80053 COMPREHEN METABOLIC PANEL: CPT

## 2021-10-19 PROCEDURE — 80061 LIPID PANEL: CPT

## 2021-10-19 PROCEDURE — 36415 COLL VENOUS BLD VENIPUNCTURE: CPT

## 2021-11-19 ENCOUNTER — OFFICE VISIT (OUTPATIENT)
Dept: MEDICAL GROUP | Facility: MEDICAL CENTER | Age: 36
End: 2021-11-19
Payer: COMMERCIAL

## 2021-11-19 VITALS
OXYGEN SATURATION: 93 % | HEIGHT: 74 IN | WEIGHT: 267 LBS | BODY MASS INDEX: 34.27 KG/M2 | SYSTOLIC BLOOD PRESSURE: 132 MMHG | RESPIRATION RATE: 12 BRPM | TEMPERATURE: 97.3 F | DIASTOLIC BLOOD PRESSURE: 78 MMHG | HEART RATE: 58 BPM

## 2021-11-19 DIAGNOSIS — E78.5 HYPERLIPIDEMIA, UNSPECIFIED HYPERLIPIDEMIA TYPE: ICD-10-CM

## 2021-11-19 DIAGNOSIS — F41.9 ANXIETY AND DEPRESSION: ICD-10-CM

## 2021-11-19 DIAGNOSIS — R53.83 OTHER FATIGUE: ICD-10-CM

## 2021-11-19 DIAGNOSIS — F32.A ANXIETY AND DEPRESSION: ICD-10-CM

## 2021-11-19 DIAGNOSIS — I10 PRIMARY HYPERTENSION: ICD-10-CM

## 2021-11-19 DIAGNOSIS — R74.01 TRANSAMINITIS: ICD-10-CM

## 2021-11-19 PROCEDURE — 99214 OFFICE O/P EST MOD 30 MIN: CPT | Performed by: FAMILY MEDICINE

## 2021-11-19 ASSESSMENT — PATIENT HEALTH QUESTIONNAIRE - PHQ9
5. POOR APPETITE OR OVEREATING: 2 - MORE THAN HALF THE DAYS
SUM OF ALL RESPONSES TO PHQ QUESTIONS 1-9: 11
CLINICAL INTERPRETATION OF PHQ2 SCORE: 4

## 2021-11-19 NOTE — ASSESSMENT & PLAN NOTE
The patient has anxiety treated with Zoloft.  He also describes some depressive symptoms and generalized fatigue today.    Depression Screening    Little interest or pleasure in doing things?  3 - nearly every day   Feeling down, depressed , or hopeless? 1 - several days   Trouble falling or staying asleep, or sleeping too much?  0 - not at all   Feeling tired or having little energy?  2 - more than half the days   Poor appetite or overeating?  2 - more than half the days   Feeling bad about yourself - or that you are a failure or have let yourself or your family down? 0 - not at all   Trouble concentrating on things, such as reading the newspaper or watching television? 2 - more than half the days   Moving or speaking so slowly that other people could have noticed.  Or the opposite - being so fidgety or restless that you have been moving around a lot more than usual?  1 - several days   Thoughts that you would be better off dead, or of hurting yourself?  0 - not at all   Patient Health Questionnaire Score: 11       If depressive symptoms identified deferred to follow up visit unless specifically addressed in assesment and plan.    Interpretation of PHQ-9 Total Score   Score Severity   1-4 No Depression   5-9 Mild Depression   10-14 Moderate Depression   15-19 Moderately Severe Depression   20-27 Severe Depression

## 2021-11-19 NOTE — ASSESSMENT & PLAN NOTE
Noted on labs.  The patient drinks 4 to 5 glasses of wine each night and also drinks Trulys and liquor.  His BMI is 34.

## 2021-11-19 NOTE — PROGRESS NOTES
[facility]  Progress Note  Established Patient    Subjective:   Vaibhav Guillermo is a 36 y.o. male here today with a chief complaint of high cholesterol. The patient is alone.     Hypertension  The patient's blood pressure is at goal.    Hyperlipidemia  The patient has a significant dyslipidemia.  He states that he drinks 4 to 5 glasses of wine each night, and will also drink Trulys and liquor.  His BMI is 34.    Anxiety and depression  The patient has anxiety treated with Zoloft.  He also describes some depressive symptoms and generalized fatigue today.    Depression Screening    Little interest or pleasure in doing things?  3 - nearly every day   Feeling down, depressed , or hopeless? 1 - several days   Trouble falling or staying asleep, or sleeping too much?  0 - not at all   Feeling tired or having little energy?  2 - more than half the days   Poor appetite or overeating?  2 - more than half the days   Feeling bad about yourself - or that you are a failure or have let yourself or your family down? 0 - not at all   Trouble concentrating on things, such as reading the newspaper or watching television? 2 - more than half the days   Moving or speaking so slowly that other people could have noticed.  Or the opposite - being so fidgety or restless that you have been moving around a lot more than usual?  1 - several days   Thoughts that you would be better off dead, or of hurting yourself?  0 - not at all   Patient Health Questionnaire Score: 11       If depressive symptoms identified deferred to follow up visit unless specifically addressed in assesment and plan.    Interpretation of PHQ-9 Total Score   Score Severity   1-4 No Depression   5-9 Mild Depression   10-14 Moderate Depression   15-19 Moderately Severe Depression   20-27 Severe Depression        Transaminitis  Noted on labs.  The patient drinks 4 to 5 glasses of wine each night and also drinks Trulys and liquor.  His BMI is 34.    Fatigue  The  "patient describes generalized fatigue.  He also has some depressive symptoms.  His BMI is 34.      Current Outpatient Medications on File Prior to Visit   Medication Sig Dispense Refill   • sertraline (ZOLOFT) 25 MG tablet TAKE 1 TABLET BY MOUTH EVERY DAY 90 Tablet 3   • albuterol 108 (90 Base) MCG/ACT Aero Soln inhalation aerosol INHALE 1-2 PUFFS BY MOUTH EVERY FOUR HOURS AS NEEDED FOR SHORTNESS OF BREATH 1 Each 6   • losartan-hydrochlorothiazide (HYZAAR) 50-12.5 MG per tablet Take 1 tablet by mouth every day. 100 tablet 4   • cetirizine (ZYRTEC) 10 MG Tab Take 10 mg by mouth every day.     • fluticasone (FLONASE) 50 MCG/ACT nasal spray Spray 1 Spray in nose every day.     • triamcinolone acetonide (KENALOG) 0.1 % Ointment Apply a thin layer to skin BID PRN rash. Do not use longer than 2 weeks 60 g 0   • multivitamin (THERAGRAN) Tab Take 3 Tabs by mouth every day.       No current facility-administered medications on file prior to visit.          Objective:     Vitals:    11/19/21 0919   BP: 132/78   BP Location: Left arm   Patient Position: Sitting   BP Cuff Size: Adult   Pulse: (!) 58   Resp: 12   Temp: 36.3 °C (97.3 °F)   TempSrc: Temporal   SpO2: 93%   Weight: 121 kg (267 lb)   Height: 1.88 m (6' 2\")       Physical Exam:  General: alert in no apparent distress.   Abd: no HSM.         Assessment and Plan:     1. Primary hypertension  -Continue current regimen.    2. Hyperlipidemia, unspecified hyperlipidemia type  The patient's cholesterol is very elevated.  I emphasized to him the importance of diet and exercise.  I recommended the Mediterranean diet and regular cardiovascular exercise.  I also emphasized the importance of reducing his alcohol intake.  He is currently drinking 4 to 5 glasses of wine each night.  I recommended he reduce this to 3 each night for the next week, 2 each night for the week after that and then limited to no more than 1 drink each night after that.  We will recheck labs in 6 weeks.  If " his LDL remains over 190, we may need to start him on a statin medication.  - Lipid Profile; Future    3. Transaminitis  May be due to alcohol intake, possibly compounded by fatty liver.  See discussion above for recommendations.  We will recheck labs in 6 weeks.  - HEPATIC FUNCTION PANEL; Future    4. Other fatigue  Likely multifactorial including deconditioning, depression and possibly sleep apnea.  We will rule out metabolic causes and treat the depression, as discussed elsewhere.  Will bring back in 2 months for reevaluation.  - CBC WITH DIFFERENTIAL; Future  - TSH WITH REFLEX TO FT4; Future  - TESTOSTERONE SERUM; Future    5. Anxiety and depression  The patient will continue Zoloft. Will refer to behavioral health.         Followup: Return in about 2 months (around 1/19/2022), or if symptoms worsen or fail to improve.

## 2021-11-19 NOTE — ASSESSMENT & PLAN NOTE
The patient has a significant dyslipidemia.  He states that he drinks 4 to 5 glasses of wine each night, and will also drink Trulys and liquor.  His BMI is 34.

## 2022-02-04 ENCOUNTER — HOSPITAL ENCOUNTER (OUTPATIENT)
Dept: LAB | Facility: MEDICAL CENTER | Age: 37
End: 2022-02-04
Attending: FAMILY MEDICINE
Payer: COMMERCIAL

## 2022-02-04 DIAGNOSIS — E78.5 HYPERLIPIDEMIA, UNSPECIFIED HYPERLIPIDEMIA TYPE: ICD-10-CM

## 2022-02-04 DIAGNOSIS — R53.83 OTHER FATIGUE: ICD-10-CM

## 2022-02-04 DIAGNOSIS — R74.01 TRANSAMINITIS: ICD-10-CM

## 2022-02-04 LAB
ALBUMIN SERPL BCP-MCNC: 5.1 G/DL (ref 3.2–4.9)
ALP SERPL-CCNC: 77 U/L (ref 30–99)
ALT SERPL-CCNC: 185 U/L (ref 2–50)
AST SERPL-CCNC: 102 U/L (ref 12–45)
BASOPHILS # BLD AUTO: 1.3 % (ref 0–1.8)
BASOPHILS # BLD: 0.06 K/UL (ref 0–0.12)
BILIRUB CONJ SERPL-MCNC: <0.2 MG/DL (ref 0.1–0.5)
BILIRUB INDIRECT SERPL-MCNC: ABNORMAL MG/DL (ref 0–1)
BILIRUB SERPL-MCNC: 0.5 MG/DL (ref 0.1–1.5)
CHOLEST SERPL-MCNC: 270 MG/DL (ref 100–199)
EOSINOPHIL # BLD AUTO: 0.37 K/UL (ref 0–0.51)
EOSINOPHIL NFR BLD: 8.3 % (ref 0–6.9)
ERYTHROCYTE [DISTWIDTH] IN BLOOD BY AUTOMATED COUNT: 45.9 FL (ref 35.9–50)
FASTING STATUS PATIENT QL REPORTED: NORMAL
HCT VFR BLD AUTO: 46.4 % (ref 42–52)
HDLC SERPL-MCNC: 49 MG/DL
HGB BLD-MCNC: 16 G/DL (ref 14–18)
IMM GRANULOCYTES # BLD AUTO: 0.05 K/UL (ref 0–0.11)
IMM GRANULOCYTES NFR BLD AUTO: 1.1 % (ref 0–0.9)
LDLC SERPL CALC-MCNC: 175 MG/DL
LYMPHOCYTES # BLD AUTO: 1.93 K/UL (ref 1–4.8)
LYMPHOCYTES NFR BLD: 43.4 % (ref 22–41)
MCH RBC QN AUTO: 34.2 PG (ref 27–33)
MCHC RBC AUTO-ENTMCNC: 34.5 G/DL (ref 33.7–35.3)
MCV RBC AUTO: 99.1 FL (ref 81.4–97.8)
MONOCYTES # BLD AUTO: 0.51 K/UL (ref 0–0.85)
MONOCYTES NFR BLD AUTO: 11.5 % (ref 0–13.4)
NEUTROPHILS # BLD AUTO: 1.53 K/UL (ref 1.82–7.42)
NEUTROPHILS NFR BLD: 34.4 % (ref 44–72)
NRBC # BLD AUTO: 0 K/UL
NRBC BLD-RTO: 0 /100 WBC
PLATELET # BLD AUTO: 287 K/UL (ref 164–446)
PMV BLD AUTO: 10.3 FL (ref 9–12.9)
PROT SERPL-MCNC: 8.2 G/DL (ref 6–8.2)
RBC # BLD AUTO: 4.68 M/UL (ref 4.7–6.1)
TESTOST SERPL-MCNC: 419 NG/DL (ref 175–781)
TRIGL SERPL-MCNC: 228 MG/DL (ref 0–149)
TSH SERPL DL<=0.005 MIU/L-ACNC: 1.8 UIU/ML (ref 0.38–5.33)
WBC # BLD AUTO: 4.5 K/UL (ref 4.8–10.8)

## 2022-02-04 PROCEDURE — 85025 COMPLETE CBC W/AUTO DIFF WBC: CPT

## 2022-02-04 PROCEDURE — 84403 ASSAY OF TOTAL TESTOSTERONE: CPT

## 2022-02-04 PROCEDURE — 80061 LIPID PANEL: CPT

## 2022-02-04 PROCEDURE — 36415 COLL VENOUS BLD VENIPUNCTURE: CPT

## 2022-02-04 PROCEDURE — 80076 HEPATIC FUNCTION PANEL: CPT

## 2022-02-04 PROCEDURE — 84443 ASSAY THYROID STIM HORMONE: CPT

## 2022-03-03 ENCOUNTER — TELEMEDICINE (OUTPATIENT)
Dept: MEDICAL GROUP | Facility: MEDICAL CENTER | Age: 37
End: 2022-03-03
Payer: COMMERCIAL

## 2022-03-03 VITALS — HEIGHT: 74 IN | BODY MASS INDEX: 30.93 KG/M2 | WEIGHT: 241 LBS

## 2022-03-03 DIAGNOSIS — R74.01 TRANSAMINITIS: ICD-10-CM

## 2022-03-03 DIAGNOSIS — E78.5 HYPERLIPIDEMIA, UNSPECIFIED HYPERLIPIDEMIA TYPE: ICD-10-CM

## 2022-03-03 DIAGNOSIS — F32.A ANXIETY AND DEPRESSION: ICD-10-CM

## 2022-03-03 DIAGNOSIS — F41.9 ANXIETY AND DEPRESSION: ICD-10-CM

## 2022-03-03 PROBLEM — R53.83 FATIGUE: Status: RESOLVED | Noted: 2021-11-19 | Resolved: 2022-03-03

## 2022-03-03 PROCEDURE — 99214 OFFICE O/P EST MOD 30 MIN: CPT | Mod: 95 | Performed by: FAMILY MEDICINE

## 2022-03-03 ASSESSMENT — PATIENT HEALTH QUESTIONNAIRE - PHQ9: CLINICAL INTERPRETATION OF PHQ2 SCORE: 0

## 2022-03-03 ASSESSMENT — FIBROSIS 4 INDEX: FIB4 SCORE: 0.97

## 2022-03-03 NOTE — PROGRESS NOTES
Virtual Visit: Established Patient   This visit was conducted via Zoom using secure and encrypted videoconferencing technology.   The patient was in their home in the [facility].    The patient's identity was confirmed and verbal consent was obtained for this virtual visit.    Subjective:   CC:   Chief Complaint   Patient presents with   • Lab Results     Follow up     Vaibhav Guillermo is a 36 y.o. male presenting for evaluation and management of:    Anxiety and depression  Doing a bit better on the Zoloft.  The patient is eager to get off of the Zoloft.  He has not yet made an appointment with a counselor.    Hyperlipidemia  Improved slightly.  10-year ASCVD risk is 1.6%.    Transaminitis  Persist.  The patient continues to drink and his BMI is 30.        Current medicines (including changes today)  Current Outpatient Medications   Medication Sig Dispense Refill   • sertraline (ZOLOFT) 25 MG tablet TAKE 1 TABLET BY MOUTH EVERY DAY 90 Tablet 3   • albuterol 108 (90 Base) MCG/ACT Aero Soln inhalation aerosol INHALE 1-2 PUFFS BY MOUTH EVERY FOUR HOURS AS NEEDED FOR SHORTNESS OF BREATH 1 Each 6   • losartan-hydrochlorothiazide (HYZAAR) 50-12.5 MG per tablet Take 1 tablet by mouth every day. 100 tablet 4   • cetirizine (ZYRTEC) 10 MG Tab Take 10 mg by mouth every day.     • fluticasone (FLONASE) 50 MCG/ACT nasal spray Spray 1 Spray in nose every day.     • triamcinolone acetonide (KENALOG) 0.1 % Ointment Apply a thin layer to skin BID PRN rash. Do not use longer than 2 weeks 60 g 0   • multivitamin (THERAGRAN) Tab Take 3 Tabs by mouth every day.       No current facility-administered medications for this visit.       Patient Active Problem List    Diagnosis Date Noted   • Transaminitis 11/19/2021   • Anxiety and depression 03/30/2021   • Androgenic alopecia 08/07/2020   • Dupuytren contracture 08/07/2020   • Rash and other nonspecific skin eruption 04/01/2020   • Snoring 02/01/2019   • Tobacco abuse 01/26/2019  "  • Hyperlipidemia 01/26/2019   • Pain in both feet 08/23/2018   • Neck pain 05/09/2018   • Healthcare maintenance 11/07/2017   • Reactive airway disease with wheezing    • Hypertension         Objective:   Ht 1.88 m (6' 2.02\")   Wt 109 kg (241 lb) Comment: Pt reported  BMI 30.93 kg/m²     Physical Exam:  Constitutional: Alert, no distress, well-groomed.      Assessment and Plan:   The following treatment plan was discussed:     1. Hyperlipidemia, unspecified hyperlipidemia type  -Emphasized diet, exercise and weight loss.  No indication for statin at the current time.    2. Transaminitis  I reviewed with the patient the natural history of unchecked liver inflammation including cirrhosis, transplantation, fluid overload and death.  I emphasized to him the importance of getting a handle on this.  I recommended reducing his alcohol intake to no more than 1, and preferably no, alcohol each day.  I offered to prescribe a medicine to help him stop drinking but he  would like to do it on his own.  I advised him not to stop drinking abruptly but to taper off the alcohol.  I will get the following labs and I also informed him that fatty liver could be contributing and exercising.  Diet, exercise and weight loss.  Imaging and referred him to GI for further evaluation and management.  - IRON/TOTAL IRON BIND; Future  - FERRITIN; Future  - HEP B SURFACE ANTIGEN; Future  - HEP C VIRUS ANTIBODY; Future  - CELIAC DISEASE AB PANEL; Future  - Referral to Gastroenterology  - HEP A AB IGM; Future  - US-RUQ; Future    3. Anxiety and depression  -Continue Zoloft for now, advised to make appointment with counseling.      Follow-up: Return in about 3 months (around 6/3/2022), or if symptoms worsen or fail to improve.         "

## 2022-03-03 NOTE — ASSESSMENT & PLAN NOTE
Doing a bit better on the Zoloft.  The patient is eager to get off of the Zoloft.  He has not yet made an appointment with a counselor.

## 2022-03-11 ENCOUNTER — HOSPITAL ENCOUNTER (OUTPATIENT)
Dept: RADIOLOGY | Facility: MEDICAL CENTER | Age: 37
End: 2022-03-11
Attending: FAMILY MEDICINE
Payer: COMMERCIAL

## 2022-03-11 DIAGNOSIS — R74.01 TRANSAMINITIS: ICD-10-CM

## 2022-03-11 PROCEDURE — 76705 ECHO EXAM OF ABDOMEN: CPT

## 2022-04-15 ENCOUNTER — HOSPITAL ENCOUNTER (OUTPATIENT)
Dept: LAB | Facility: MEDICAL CENTER | Age: 37
End: 2022-04-15
Attending: FAMILY MEDICINE
Payer: COMMERCIAL

## 2022-04-15 DIAGNOSIS — R74.01 TRANSAMINITIS: ICD-10-CM

## 2022-04-15 LAB
FERRITIN SERPL-MCNC: 878 NG/ML (ref 22–322)
HAV IGM SERPL QL IA: NORMAL
HBV SURFACE AG SER QL: NORMAL
HCV AB SER QL: NORMAL
IRON SATN MFR SERPL: 40 % (ref 15–55)
IRON SERPL-MCNC: 142 UG/DL (ref 50–180)
TIBC SERPL-MCNC: 359 UG/DL (ref 250–450)
UIBC SERPL-MCNC: 217 UG/DL (ref 110–370)

## 2022-04-15 PROCEDURE — 86709 HEPATITIS A IGM ANTIBODY: CPT

## 2022-04-15 PROCEDURE — 36415 COLL VENOUS BLD VENIPUNCTURE: CPT

## 2022-04-15 PROCEDURE — 87340 HEPATITIS B SURFACE AG IA: CPT

## 2022-04-15 PROCEDURE — 83550 IRON BINDING TEST: CPT

## 2022-04-15 PROCEDURE — 86364 TISS TRNSGLTMNASE EA IG CLAS: CPT

## 2022-04-15 PROCEDURE — 82784 ASSAY IGA/IGD/IGG/IGM EACH: CPT

## 2022-04-15 PROCEDURE — 83540 ASSAY OF IRON: CPT

## 2022-04-15 PROCEDURE — 86803 HEPATITIS C AB TEST: CPT

## 2022-04-15 PROCEDURE — 82728 ASSAY OF FERRITIN: CPT

## 2022-04-18 LAB — IGA SERPL-MCNC: 219 MG/DL (ref 68–408)

## 2022-04-19 LAB — TTG IGA SER IA-ACNC: <2 U/ML (ref 0–3)

## 2022-06-07 ENCOUNTER — TELEMEDICINE (OUTPATIENT)
Dept: MEDICAL GROUP | Facility: MEDICAL CENTER | Age: 37
End: 2022-06-07
Payer: COMMERCIAL

## 2022-06-07 VITALS — HEART RATE: 72 BPM | WEIGHT: 230 LBS | HEIGHT: 74 IN | BODY MASS INDEX: 29.52 KG/M2

## 2022-06-07 DIAGNOSIS — R74.01 TRANSAMINITIS: ICD-10-CM

## 2022-06-07 DIAGNOSIS — E78.5 HYPERLIPIDEMIA, UNSPECIFIED HYPERLIPIDEMIA TYPE: ICD-10-CM

## 2022-06-07 PROCEDURE — 99213 OFFICE O/P EST LOW 20 MIN: CPT | Performed by: FAMILY MEDICINE

## 2022-06-07 ASSESSMENT — FIBROSIS 4 INDEX: FIB4 SCORE: 0.99

## 2022-06-07 NOTE — ASSESSMENT & PLAN NOTE
Likely due to fatty liver.  The patient has been actively working on diet, exercise and weight loss.  He has seen some success.  He has an upcoming appointment with GI.

## 2022-06-07 NOTE — PROGRESS NOTES
Virtual Visit: Established Patient   This visit was conducted via Zoom using secure and encrypted videoconferencing technology.   The patient was in a private location outside of their home in the state of [place].    The patient's identity was confirmed and verbal consent was obtained for this virtual visit.    Subjective:   CC:   Chief Complaint   Patient presents with   • Hyperlipidemia     Fatty liver; has an kyle with GI coming up.   Has been working on controlling his weight.      Vaibhav Guillermo is a 37 y.o. male presenting for evaluation and management of:    Transaminitis  Likely due to fatty liver.  The patient has been actively working on diet, exercise and weight loss.  He has seen some success.  He has an upcoming appointment with GI.    Hyperlipidemia  Doing well with diet, exercise and weight loss.        Current medicines (including changes today)  Current Outpatient Medications   Medication Sig Dispense Refill   • losartan-hydrochlorothiazide (HYZAAR) 50-12.5 MG per tablet TAKE ONE TABLET BY MOUTH ONE TIME DAILY 100 Tablet 3   • sertraline (ZOLOFT) 25 MG tablet TAKE 1 TABLET BY MOUTH EVERY DAY 90 Tablet 3   • albuterol 108 (90 Base) MCG/ACT Aero Soln inhalation aerosol INHALE 1-2 PUFFS BY MOUTH EVERY FOUR HOURS AS NEEDED FOR SHORTNESS OF BREATH 1 Each 6   • cetirizine (ZYRTEC) 10 MG Tab Take 10 mg by mouth every day.     • fluticasone (FLONASE) 50 MCG/ACT nasal spray Spray 1 Spray in nose every day.     • triamcinolone acetonide (KENALOG) 0.1 % Ointment Apply a thin layer to skin BID PRN rash. Do not use longer than 2 weeks 60 g 0   • multivitamin (THERAGRAN) Tab Take 3 Tabs by mouth every day. (Patient not taking: Reported on 6/7/2022)       No current facility-administered medications for this visit.       Patient Active Problem List    Diagnosis Date Noted   • Transaminitis 11/19/2021   • Anxiety and depression 03/30/2021   • Androgenic alopecia 08/07/2020   • Dupuytren contracture 08/07/2020  "  • Rash and other nonspecific skin eruption 04/01/2020   • Snoring 02/01/2019   • Tobacco abuse 01/26/2019   • Hyperlipidemia 01/26/2019   • Pain in both feet 08/23/2018   • Neck pain 05/09/2018   • Healthcare maintenance 11/07/2017   • Reactive airway disease with wheezing    • Hypertension         Objective:   Pulse 72 Comment: Pt reported  Ht 1.88 m (6' 2\") Comment: Pt reported  Wt 104 kg (230 lb) Comment: Pt reported  BMI 29.53 kg/m²     Physical Exam:  Constitutional: Alert, no distress, well-groomed.      Assessment and Plan:   The following treatment plan was discussed:     1. Transaminitis  -Encouraged continued diet, exercise and weight loss.  Advised him to follow-up with GI as recommended.    2. Hyperlipidemia, unspecified hyperlipidemia type  -Encouraged continued diet, exercise and weight loss.    I informed the patient that I will not be returning to work with [facility] as of August 01, 2022. I informed the patient that he should establish with a new doctor before then. It has been a privilege serving as this patient's family doctor and I wish him the best.  I informed the patient that if he needs to see me before I leave I'm available for appointments.    Follow-up: Return if symptoms worsen or fail to improve.         "

## 2022-11-19 DIAGNOSIS — F41.9 ANXIETY: ICD-10-CM

## 2022-11-21 RX ORDER — SERTRALINE HYDROCHLORIDE 25 MG/1
25 TABLET, FILM COATED ORAL DAILY
Qty: 30 TABLET | Refills: 0 | Status: SHIPPED | OUTPATIENT
Start: 2022-11-21 | End: 2022-12-14

## 2022-11-21 NOTE — TELEPHONE ENCOUNTER
Received request via: Pharmacy    Was the patient seen in the last year in this department? Yes    Does the patient have an active prescription (recently filled or refills available) for medication(s) requested? No    *  Future Appointments         Provider Department Center    12/29/2022 11:20 AM (Arrive by 11:05 AM) Carl Beauchamp M.D. [facility]

## 2022-12-14 DIAGNOSIS — F41.9 ANXIETY: ICD-10-CM

## 2022-12-14 RX ORDER — SERTRALINE HYDROCHLORIDE 25 MG/1
25 TABLET, FILM COATED ORAL DAILY
Qty: 30 TABLET | Refills: 0 | Status: SHIPPED | OUTPATIENT
Start: 2022-12-14 | End: 2023-02-06 | Stop reason: SDUPTHER

## 2022-12-14 NOTE — TELEPHONE ENCOUNTER
Received request via: Pharmacy    Was the patient seen in the last year in this department? Yes    Does the patient have an active prescription (recently filled or refills available) for medication(s) requested? No    Does the patient have longterm Plus and need 100 day supply (blood pressure, diabetes and cholesterol meds only)? Patient does not have SCP    Future Appointments         Provider Department Center    12/29/2022 11:20 AM (Arrive by 11:05 AM) Carl Beauchamp M.D. [facility]

## 2022-12-29 ENCOUNTER — OFFICE VISIT (OUTPATIENT)
Dept: MEDICAL GROUP | Facility: LAB | Age: 37
End: 2022-12-29
Payer: COMMERCIAL

## 2022-12-29 VITALS
WEIGHT: 246.2 LBS | BODY MASS INDEX: 31.6 KG/M2 | OXYGEN SATURATION: 95 % | HEART RATE: 70 BPM | DIASTOLIC BLOOD PRESSURE: 86 MMHG | TEMPERATURE: 97.1 F | SYSTOLIC BLOOD PRESSURE: 136 MMHG | RESPIRATION RATE: 12 BRPM | HEIGHT: 74 IN

## 2022-12-29 DIAGNOSIS — I10 PRIMARY HYPERTENSION: ICD-10-CM

## 2022-12-29 DIAGNOSIS — E78.5 HYPERLIPIDEMIA, UNSPECIFIED HYPERLIPIDEMIA TYPE: ICD-10-CM

## 2022-12-29 DIAGNOSIS — K76.0 FATTY LIVER: ICD-10-CM

## 2022-12-29 DIAGNOSIS — E66.09 CLASS 1 OBESITY DUE TO EXCESS CALORIES WITHOUT SERIOUS COMORBIDITY WITH BODY MASS INDEX (BMI) OF 31.0 TO 31.9 IN ADULT: ICD-10-CM

## 2022-12-29 DIAGNOSIS — Z23 NEED FOR VACCINATION: ICD-10-CM

## 2022-12-29 PROBLEM — M79.672 PAIN IN BOTH FEET: Status: RESOLVED | Noted: 2018-08-23 | Resolved: 2022-12-29

## 2022-12-29 PROBLEM — Z00.00 HEALTHCARE MAINTENANCE: Status: RESOLVED | Noted: 2017-11-07 | Resolved: 2022-12-29

## 2022-12-29 PROBLEM — M79.671 PAIN IN BOTH FEET: Status: RESOLVED | Noted: 2018-08-23 | Resolved: 2022-12-29

## 2022-12-29 PROBLEM — R21 RASH AND OTHER NONSPECIFIC SKIN ERUPTION: Status: RESOLVED | Noted: 2020-04-01 | Resolved: 2022-12-29

## 2022-12-29 PROCEDURE — 90471 IMMUNIZATION ADMIN: CPT | Performed by: FAMILY MEDICINE

## 2022-12-29 PROCEDURE — 90686 IIV4 VACC NO PRSV 0.5 ML IM: CPT | Performed by: FAMILY MEDICINE

## 2022-12-29 PROCEDURE — 99214 OFFICE O/P EST MOD 30 MIN: CPT | Mod: 25 | Performed by: FAMILY MEDICINE

## 2022-12-29 SDOH — HEALTH STABILITY: PHYSICAL HEALTH: ON AVERAGE, HOW MANY MINUTES DO YOU ENGAGE IN EXERCISE AT THIS LEVEL?: 50 MIN

## 2022-12-29 SDOH — ECONOMIC STABILITY: FOOD INSECURITY: WITHIN THE PAST 12 MONTHS, YOU WORRIED THAT YOUR FOOD WOULD RUN OUT BEFORE YOU GOT MONEY TO BUY MORE.: NEVER TRUE

## 2022-12-29 SDOH — ECONOMIC STABILITY: HOUSING INSECURITY
IN THE LAST 12 MONTHS, WAS THERE A TIME WHEN YOU DID NOT HAVE A STEADY PLACE TO SLEEP OR SLEPT IN A SHELTER (INCLUDING NOW)?: NO

## 2022-12-29 SDOH — ECONOMIC STABILITY: HOUSING INSECURITY: IN THE LAST 12 MONTHS, HOW MANY PLACES HAVE YOU LIVED?: 1

## 2022-12-29 SDOH — ECONOMIC STABILITY: TRANSPORTATION INSECURITY
IN THE PAST 12 MONTHS, HAS LACK OF TRANSPORTATION KEPT YOU FROM MEETINGS, WORK, OR FROM GETTING THINGS NEEDED FOR DAILY LIVING?: NO

## 2022-12-29 SDOH — ECONOMIC STABILITY: FOOD INSECURITY: WITHIN THE PAST 12 MONTHS, THE FOOD YOU BOUGHT JUST DIDN'T LAST AND YOU DIDN'T HAVE MONEY TO GET MORE.: NEVER TRUE

## 2022-12-29 SDOH — ECONOMIC STABILITY: INCOME INSECURITY: IN THE LAST 12 MONTHS, WAS THERE A TIME WHEN YOU WERE NOT ABLE TO PAY THE MORTGAGE OR RENT ON TIME?: NO

## 2022-12-29 SDOH — ECONOMIC STABILITY: TRANSPORTATION INSECURITY
IN THE PAST 12 MONTHS, HAS THE LACK OF TRANSPORTATION KEPT YOU FROM MEDICAL APPOINTMENTS OR FROM GETTING MEDICATIONS?: NO

## 2022-12-29 SDOH — ECONOMIC STABILITY: TRANSPORTATION INSECURITY
IN THE PAST 12 MONTHS, HAS LACK OF RELIABLE TRANSPORTATION KEPT YOU FROM MEDICAL APPOINTMENTS, MEETINGS, WORK OR FROM GETTING THINGS NEEDED FOR DAILY LIVING?: NO

## 2022-12-29 SDOH — HEALTH STABILITY: PHYSICAL HEALTH: ON AVERAGE, HOW MANY DAYS PER WEEK DO YOU ENGAGE IN MODERATE TO STRENUOUS EXERCISE (LIKE A BRISK WALK)?: 5 DAYS

## 2022-12-29 SDOH — ECONOMIC STABILITY: INCOME INSECURITY: HOW HARD IS IT FOR YOU TO PAY FOR THE VERY BASICS LIKE FOOD, HOUSING, MEDICAL CARE, AND HEATING?: NOT HARD AT ALL

## 2022-12-29 SDOH — HEALTH STABILITY: MENTAL HEALTH
STRESS IS WHEN SOMEONE FEELS TENSE, NERVOUS, ANXIOUS, OR CAN'T SLEEP AT NIGHT BECAUSE THEIR MIND IS TROUBLED. HOW STRESSED ARE YOU?: TO SOME EXTENT

## 2022-12-29 ASSESSMENT — SOCIAL DETERMINANTS OF HEALTH (SDOH)
HOW OFTEN DO YOU HAVE A DRINK CONTAINING ALCOHOL: 2-4 TIMES A MONTH
IN A TYPICAL WEEK, HOW MANY TIMES DO YOU TALK ON THE PHONE WITH FAMILY, FRIENDS, OR NEIGHBORS?: THREE TIMES A WEEK
HOW OFTEN DO YOU ATTENT MEETINGS OF THE CLUB OR ORGANIZATION YOU BELONG TO?: NEVER
HOW OFTEN DO YOU HAVE SIX OR MORE DRINKS ON ONE OCCASION: WEEKLY
HOW OFTEN DO YOU ATTEND CHURCH OR RELIGIOUS SERVICES?: NEVER
HOW MANY DRINKS CONTAINING ALCOHOL DO YOU HAVE ON A TYPICAL DAY WHEN YOU ARE DRINKING: 5 OR 6
HOW OFTEN DO YOU GET TOGETHER WITH FRIENDS OR RELATIVES?: ONCE A WEEK
HOW OFTEN DO YOU ATTEND CHURCH OR RELIGIOUS SERVICES?: NEVER
DO YOU BELONG TO ANY CLUBS OR ORGANIZATIONS SUCH AS CHURCH GROUPS UNIONS, FRATERNAL OR ATHLETIC GROUPS, OR SCHOOL GROUPS?: NO
WITHIN THE PAST 12 MONTHS, YOU WORRIED THAT YOUR FOOD WOULD RUN OUT BEFORE YOU GOT THE MONEY TO BUY MORE: NEVER TRUE
DO YOU BELONG TO ANY CLUBS OR ORGANIZATIONS SUCH AS CHURCH GROUPS UNIONS, FRATERNAL OR ATHLETIC GROUPS, OR SCHOOL GROUPS?: NO
HOW HARD IS IT FOR YOU TO PAY FOR THE VERY BASICS LIKE FOOD, HOUSING, MEDICAL CARE, AND HEATING?: NOT HARD AT ALL
IN A TYPICAL WEEK, HOW MANY TIMES DO YOU TALK ON THE PHONE WITH FAMILY, FRIENDS, OR NEIGHBORS?: THREE TIMES A WEEK
HOW OFTEN DO YOU GET TOGETHER WITH FRIENDS OR RELATIVES?: ONCE A WEEK
HOW OFTEN DO YOU ATTENT MEETINGS OF THE CLUB OR ORGANIZATION YOU BELONG TO?: NEVER

## 2022-12-29 ASSESSMENT — FIBROSIS 4 INDEX: FIB4 SCORE: 0.99

## 2022-12-29 ASSESSMENT — LIFESTYLE VARIABLES
SKIP TO QUESTIONS 9-10: 0
HOW OFTEN DO YOU HAVE SIX OR MORE DRINKS ON ONE OCCASION: WEEKLY
HOW OFTEN DO YOU HAVE A DRINK CONTAINING ALCOHOL: 2-4 TIMES A MONTH
AUDIT-C TOTAL SCORE: 7
HOW MANY STANDARD DRINKS CONTAINING ALCOHOL DO YOU HAVE ON A TYPICAL DAY: 5 OR 6

## 2022-12-29 NOTE — PROGRESS NOTES
Vaibhav Guillermo is a 37 y.o. male here to establish care and discuss chronic medical conditions.    Currently following with GI for fatty liver.  Their notes are reviewed, possibly multifactorial with alcohol use, weight, genetics.  He does have additional lab work-up pending.  He is working on weight loss with more regular exercise, intermittent fasting.  Has significantly reduced alcohol use and now with only occasional use.  On losartan-HCTZ for hypertension.  Works as a fire .    Current medicines (including changes today)  Current Outpatient Medications   Medication Sig Dispense Refill    sertraline (ZOLOFT) 25 MG tablet TAKE 1 TABLET BY MOUTH EVERY DAY 30 Tablet 0    losartan-hydrochlorothiazide (HYZAAR) 50-12.5 MG per tablet TAKE ONE TABLET BY MOUTH ONE TIME DAILY 100 Tablet 3    albuterol 108 (90 Base) MCG/ACT Aero Soln inhalation aerosol INHALE 1-2 PUFFS BY MOUTH EVERY FOUR HOURS AS NEEDED FOR SHORTNESS OF BREATH 1 Each 6    cetirizine (ZYRTEC) 10 MG Tab Take 10 mg by mouth every day.      fluticasone (FLONASE) 50 MCG/ACT nasal spray Spray 1 Spray in nose every day.      triamcinolone acetonide (KENALOG) 0.1 % Ointment Apply a thin layer to skin BID PRN rash. Do not use longer than 2 weeks 60 g 0    multivitamin (THERAGRAN) Tab Take 3 Tablets by mouth every day.       No current facility-administered medications for this visit.     He  has a past medical history of Allergy, Asthma, Hyperlipidemia, Hypertension, and Reactive airway disease with wheezing.  He  has no past surgical history on file.  Social History     Tobacco Use    Smoking status: Former     Types: Cigarettes     Quit date: 3/1/2011     Years since quittin.8    Smokeless tobacco: Former     Types: Chew     Quit date: 2019    Tobacco comments:     discussed   Vaping Use    Vaping Use: Never used   Substance Use Topics    Alcohol use: Yes     Alcohol/week: 1.8 oz     Types: 3 Cans of beer per week     Comment: was  "drinking heavy on weekends now 2-3 per week    Drug use: Not Currently     Types: Marijuana     Comment: Occasional     Social History     Social History Narrative    Not on file     Family History   Problem Relation Age of Onset    Stroke Paternal Grandfather 50    Hypertension Paternal Grandfather     Hypertension Paternal Uncle     Cancer Maternal Grandfather         prostate, colon    No Known Problems Mother     Hypertension Father     No Known Problems Sister      Family Status   Relation Name Status    PGFa  (Not Specified)    PUnc  Alive    MGFa  (Not Specified)    Mo  Alive    Fa  Alive    Sis  Alive       ROS  See HPI     Objective:     Physical Exam:  /86 (BP Location: Right arm, Patient Position: Sitting, BP Cuff Size: Adult)   Pulse 70   Temp 36.2 °C (97.1 °F)   Resp 12   Ht 1.88 m (6' 2\")   Wt 112 kg (246 lb 3.2 oz)   SpO2 95%  Body mass index is 31.61 kg/m².  Constitutional: Alert. Well appearing. No distress.  Skin: Warm, dry, good turgor, no visible rashes.  Eye: Equal, round and reactive to light, conjunctiva clear, lids normal.  ENMT: Moist mucous membranes.   Neck: Trachea midline, no masses, no thyromegaly.   Respiratory: Normal effort. Lungs are clear to auscultation bilaterally.  Cardiovascular: Regular rate and rhythm. Normal S1/S2. No murmurs, rubs or gallops.   Neuro: Moves all four extremities. No facial droop.  Psych: Answers questions appropriately. Normal affect and mood.    Assessment and Plan:     1. Fatty liver  Following with GI. Possibly multifactorial with alcohol use, weight, genetics.  He does have additional lab work-up pending.  He has significantly reduced alcohol use, working on weight loss.    2. Primary hypertension  BP at goal but borderline.  Continue to monitor at home.  Continue losartan-HCTZ.    3. Class 1 obesity due to excess calories without serious comorbidity with body mass index (BMI) of 31.0 to 31.9 in adult  He is having success with intermittent " fasting, more regular exercise.    4. Hyperlipidemia, unspecified hyperlipidemia type  He is working on lifestyle measures, recheck lipids.  - Lipid Profile; Future    5. Need for vaccination  - INFLUENZA VACCINE QUAD INJ (PF)      Follow up: No follow-ups on file.         PLEASE NOTE: This note was created using voice recognition software.

## 2023-02-06 DIAGNOSIS — F41.9 ANXIETY: ICD-10-CM

## 2023-02-07 RX ORDER — SERTRALINE HYDROCHLORIDE 25 MG/1
25 TABLET, FILM COATED ORAL DAILY
Qty: 90 TABLET | Refills: 3 | Status: SHIPPED | OUTPATIENT
Start: 2023-02-07 | End: 2024-01-25

## 2024-01-25 DIAGNOSIS — F41.9 ANXIETY: ICD-10-CM

## 2024-01-25 RX ORDER — SERTRALINE HYDROCHLORIDE 25 MG/1
25 TABLET, FILM COATED ORAL DAILY
Qty: 90 TABLET | Refills: 0 | Status: SHIPPED | OUTPATIENT
Start: 2024-01-25

## 2024-05-06 DIAGNOSIS — F41.9 ANXIETY: ICD-10-CM

## 2024-05-07 RX ORDER — SERTRALINE HYDROCHLORIDE 25 MG/1
25 TABLET, FILM COATED ORAL DAILY
Qty: 90 TABLET | Refills: 0 | Status: SHIPPED | OUTPATIENT
Start: 2024-05-07

## 2024-05-18 DIAGNOSIS — I10 ESSENTIAL HYPERTENSION: ICD-10-CM

## 2024-05-20 RX ORDER — LOSARTAN POTASSIUM AND HYDROCHLOROTHIAZIDE 12.5; 5 MG/1; MG/1
TABLET ORAL
Qty: 30 TABLET | Refills: 0 | Status: SHIPPED | OUTPATIENT
Start: 2024-05-20

## 2024-07-09 ENCOUNTER — OFFICE VISIT (OUTPATIENT)
Dept: MEDICAL GROUP | Facility: LAB | Age: 39
End: 2024-07-09
Payer: COMMERCIAL

## 2024-07-09 VITALS
HEART RATE: 60 BPM | WEIGHT: 255 LBS | OXYGEN SATURATION: 96 % | BODY MASS INDEX: 32.73 KG/M2 | SYSTOLIC BLOOD PRESSURE: 128 MMHG | TEMPERATURE: 98.2 F | HEIGHT: 74 IN | RESPIRATION RATE: 16 BRPM | DIASTOLIC BLOOD PRESSURE: 80 MMHG

## 2024-07-09 DIAGNOSIS — E78.5 HYPERLIPIDEMIA, UNSPECIFIED HYPERLIPIDEMIA TYPE: ICD-10-CM

## 2024-07-09 DIAGNOSIS — J45.909 REACTIVE AIRWAY DISEASE WITH WHEEZING: ICD-10-CM

## 2024-07-09 DIAGNOSIS — Z13.29 SCREENING FOR THYROID DISORDER: ICD-10-CM

## 2024-07-09 DIAGNOSIS — I10 ESSENTIAL HYPERTENSION: ICD-10-CM

## 2024-07-09 DIAGNOSIS — Z13.0 SCREENING FOR DEFICIENCY ANEMIA: ICD-10-CM

## 2024-07-09 PROCEDURE — 99214 OFFICE O/P EST MOD 30 MIN: CPT | Performed by: FAMILY MEDICINE

## 2024-07-09 PROCEDURE — 3079F DIAST BP 80-89 MM HG: CPT | Performed by: FAMILY MEDICINE

## 2024-07-09 PROCEDURE — 3074F SYST BP LT 130 MM HG: CPT | Performed by: FAMILY MEDICINE

## 2024-07-09 RX ORDER — ALBUTEROL SULFATE 90 UG/1
1-2 AEROSOL, METERED RESPIRATORY (INHALATION) EVERY 4 HOURS PRN
Qty: 1 EACH | Refills: 6 | Status: SHIPPED | OUTPATIENT
Start: 2024-07-09

## 2024-07-09 RX ORDER — LOSARTAN POTASSIUM AND HYDROCHLOROTHIAZIDE 12.5; 5 MG/1; MG/1
1 TABLET ORAL DAILY
Qty: 90 TABLET | Refills: 1 | Status: SHIPPED | OUTPATIENT
Start: 2024-07-09

## 2024-08-02 DIAGNOSIS — F41.9 ANXIETY: ICD-10-CM

## 2024-08-02 RX ORDER — SERTRALINE HYDROCHLORIDE 25 MG/1
25 TABLET, FILM COATED ORAL DAILY
Qty: 90 TABLET | Refills: 0 | Status: SHIPPED | OUTPATIENT
Start: 2024-08-02

## 2024-08-02 NOTE — TELEPHONE ENCOUNTER
Received request via: Pharmacy    Was the patient seen in the last year in this department? Yes7/9/24    Does the patient have an active prescription (recently filled or refills available) for medication(s) requested? No    [facility]    Does the patient have long-term Plus and need 100 day supply (blood pressure, diabetes and cholesterol meds only)? Medication is not for cholesterol, blood pressure or diabetes

## 2024-11-08 DIAGNOSIS — F41.9 ANXIETY: ICD-10-CM

## 2024-11-08 RX ORDER — SERTRALINE HYDROCHLORIDE 25 MG/1
25 TABLET, FILM COATED ORAL DAILY
Qty: 90 TABLET | Refills: 0 | Status: SHIPPED | OUTPATIENT
Start: 2024-11-08

## 2025-01-13 ENCOUNTER — TELEPHONE (OUTPATIENT)
Dept: MEDICAL GROUP | Facility: MEDICAL CENTER | Age: 40
End: 2025-01-13
Payer: COMMERCIAL

## 2025-01-13 DIAGNOSIS — I10 ESSENTIAL HYPERTENSION: ICD-10-CM

## 2025-01-13 RX ORDER — LOSARTAN POTASSIUM AND HYDROCHLOROTHIAZIDE 12.5; 5 MG/1; MG/1
1 TABLET ORAL DAILY
Qty: 90 TABLET | Refills: 0 | Status: SHIPPED | OUTPATIENT
Start: 2025-01-13

## 2025-01-13 NOTE — TELEPHONE ENCOUNTER
Patient has an [facility] appointment with Dr. Elisha Cortes on 01/15 at 10:45am.. Pt is kindly requesting if the provider can send a prescription for Losartan. Patient mentioned that he has ran out for days.. Please notify the patient when the prescription is sent to the pharmacy.. Thank you.

## 2025-01-15 ENCOUNTER — OFFICE VISIT (OUTPATIENT)
Dept: MEDICAL GROUP | Facility: MEDICAL CENTER | Age: 40
End: 2025-01-15
Payer: COMMERCIAL

## 2025-01-15 ENCOUNTER — RESEARCH ENCOUNTER (OUTPATIENT)
Dept: RESEARCH | Facility: MEDICAL CENTER | Age: 40
End: 2025-01-15

## 2025-01-15 VITALS
HEIGHT: 74 IN | HEART RATE: 77 BPM | SYSTOLIC BLOOD PRESSURE: 136 MMHG | BODY MASS INDEX: 33.75 KG/M2 | DIASTOLIC BLOOD PRESSURE: 80 MMHG | TEMPERATURE: 97.1 F | OXYGEN SATURATION: 94 % | WEIGHT: 263 LBS

## 2025-01-15 DIAGNOSIS — F41.9 ANXIETY AND DEPRESSION: ICD-10-CM

## 2025-01-15 DIAGNOSIS — F32.A ANXIETY AND DEPRESSION: ICD-10-CM

## 2025-01-15 DIAGNOSIS — I10 PRIMARY HYPERTENSION: ICD-10-CM

## 2025-01-15 DIAGNOSIS — Z91.89 AT RISK FOR SLEEP APNEA: ICD-10-CM

## 2025-01-15 DIAGNOSIS — Z82.3: ICD-10-CM

## 2025-01-15 DIAGNOSIS — Z00.00 HEALTHCARE MAINTENANCE: ICD-10-CM

## 2025-01-15 DIAGNOSIS — E78.5 HYPERLIPIDEMIA, UNSPECIFIED HYPERLIPIDEMIA TYPE: ICD-10-CM

## 2025-01-15 DIAGNOSIS — R74.01 TRANSAMINITIS: ICD-10-CM

## 2025-01-15 DIAGNOSIS — I10 ESSENTIAL HYPERTENSION: ICD-10-CM

## 2025-01-15 DIAGNOSIS — J34.2 DEVIATED NASAL SEPTUM: ICD-10-CM

## 2025-01-15 DIAGNOSIS — F41.9 ANXIETY: ICD-10-CM

## 2025-01-15 PROCEDURE — 99215 OFFICE O/P EST HI 40 MIN: CPT | Performed by: STUDENT IN AN ORGANIZED HEALTH CARE EDUCATION/TRAINING PROGRAM

## 2025-01-15 PROCEDURE — 3079F DIAST BP 80-89 MM HG: CPT | Performed by: STUDENT IN AN ORGANIZED HEALTH CARE EDUCATION/TRAINING PROGRAM

## 2025-01-15 PROCEDURE — 3075F SYST BP GE 130 - 139MM HG: CPT | Performed by: STUDENT IN AN ORGANIZED HEALTH CARE EDUCATION/TRAINING PROGRAM

## 2025-01-15 RX ORDER — SERTRALINE HYDROCHLORIDE 25 MG/1
25 TABLET, FILM COATED ORAL DAILY
Qty: 90 TABLET | Refills: 0 | Status: SHIPPED | OUTPATIENT
Start: 2025-01-15

## 2025-01-15 ASSESSMENT — PATIENT HEALTH QUESTIONNAIRE - PHQ9: CLINICAL INTERPRETATION OF PHQ2 SCORE: 0

## 2025-01-15 NOTE — PROGRESS NOTES
Subjective:     CC:   Chief Complaint   Patient presents with    Establish Care    Other     General health        HPI:   Vaibhav presents today with    Verbal consent was acquired by the patient to use General Specific ambient listening note generation during this visit Yes   History of Present Illness  The patient is a 39-year-old male who presents for evaluation of hypertension, elevated liver enzymes, hypercholesterolemia, sleep apnea, and medication management.    He has been managing his hypertension with a low-salt diet and regular exercise, including daily walks with his dog and gym workouts. He has expressed interest in monitoring his blood pressure at home, particularly after physical activity.    He reports a decrease in alcohol consumption over the past few years.    He has a family history of hypercholesterolemia, with his grandfather having suffered a fatal stroke at the age of 52.    He has been diagnosed with sleep apnea, which was identified during a consultation with Dr. Hays. He experiences daytime fatigue, frequent napping, and occasional headaches upon waking. He also reports difficulty breathing through his nose and has not sought ENT consultation for this issue. He has expressed interest in understanding the potential impact of weight loss on his sleep apnea.    He is currently on sertraline for stress management and reports that it has been effective. He is seeking a refill of this medication.    SOCIAL HISTORY  The patient reports drinking less alcohol recently.    FAMILY HISTORY  The patient's grandfather  from a stroke at the age of 52.    MEDICATIONS  Current: Losartan, hydralazine, sertraline    Results  Laboratory Studies  Liver numbers were borderline elevation 51, 95 in  and went up to 102 and 185 in 202. LDL cholesterol was 195 in 202 and came down to 175 in 202. Iron levels were fine. Hemoglobin levels were normal. MCV was high.    Imaging  CT abdomen done in 2018 showed  "normal liver.       Health Maintenance: Completed    ROS:  ROS    Review of systems unremarkable except for concerns noted by patient or items listed.    Please see HPI for additional ROS.      Objective:     Exam:  /80 (BP Location: Right arm, Patient Position: Sitting, BP Cuff Size: Adult long)   Pulse 77   Temp 36.2 °C (97.1 °F) (Temporal)   Ht 1.88 m (6' 2\")   Wt 119 kg (263 lb)   SpO2 94%   BMI 33.77 kg/m²  Body mass index is 33.77 kg/m².    Physical Exam  Constitutional:       General: He is not in acute distress.     Appearance: Normal appearance.   HENT:      Head: Normocephalic.   Cardiovascular:      Rate and Rhythm: Normal rate and regular rhythm.      Pulses: Normal pulses.   Pulmonary:      Effort: Pulmonary effort is normal.   Skin:     General: Skin is warm.   Neurological:      Mental Status: He is alert and oriented to person, place, and time.   Psychiatric:         Mood and Affect: Mood normal.         Behavior: Behavior normal.             Labs: reviewed     Assessment & Plan:     39 y.o. male with the following -     1. Primary hypertension  Chronic, stable   Plan:  Continue Hyzaar 50-12.5 mg daily   - Advised to adhere to a Mediterranean diet (low saturated fat and salt)  - Regular exercise (strength training and cardio) for at least 45 minutes/day, 5 days/week  - check home BP regularly at least 1-2 times a week and keep log  - return to clinic in 3 months (or sooner if home BP is persistently elevated above goal).   - alcohol cessation   - regular exercises   - avoid illicit drugs and tobacco  - DASH diet , low salt diet   - weight loss.       2. Hyperlipidemia, unspecified hyperlipidemia type  Chronic, stable   Lab Results   Component Value Date/Time    CHOLSTRLTOT 270 (H) 02/04/2022 0951    TRIGLYCERIDE 228 (H) 02/04/2022 0951    HDL 49 02/04/2022 0951     (H) 02/04/2022 0951       The 10-year ASCVD risk score (Anjel REYES, et al., 2019) is: 2.7%  Plan:  - CT cardiac score " test to assess calcium deposits in blood vessels  - Lipoprotein A test to evaluate familial component  - Advised to participate in the [facility] for genetic testing  Repeat labs  Recommended to continue low fat healthy diet and regular exercise     - Lipoprotein (a); Future  - CT-CARDIAC SCORING; Future    3. Family history of cerebrovascular accident (CVA) in grandfather  - CT cardiac score test to assess calcium deposits in blood vessels  - Lipoprotein A test to evaluate familial component  - Advised to participate in the [facility] for genetic testing  - Lipoprotein (a); Future  - CT-CARDIAC SCORING; Future    4. At risk for sleep apnea  STOPBANG score 6   EPWORTH SLEEP score 12  Plan:  - Overnight Home Sleep Study; Future  - Referral to ENT  - Referral to Pulmonary and Sleep Medicine    5. Deviated nasal septum  - Referral to ENT    6. Anxiety  Chronic, stable  - sertraline (ZOLOFT) 25 MG tablet; Take 1 Tablet by mouth every day.  Dispense: 90 Tablet; Refill: 0    7.Transaminitis  . Elevated liver enzymes:  - Progressive increase in liver function tests from 2021 to 2022, possibly due to fatty liver disease  - Advised to abstain from alcohol or limit intake to 5-6 drinks/week, avoiding binge drinking  - Comprehensive metabolic panel to be ordered to assess liver function  - Consider liver ultrasound if liver enzymes remain elevated      Labs:  - A1c test to be ordered to screen for diabetes  - Thyroid function to be assessed as part of routine lab work  - Future thyroid checks to be done randomly if results are normal  - Comp Metabolic Panel; Future  - Lipid Profile; Future  - CBC WITHOUT DIFFERENTIAL; Future  - TSH WITH REFLEX TO FT4; Future  - HEMOGLOBIN A1C; Future    I spent a total of 42 minutes with record review, exam, communication with the patient, communication with other providers, and documentation of this encounter  I spent at least 50% of the time for counseling and  education.       F/u 3 months     Please note that this dictation was created using voice recognition software. I have made every reasonable attempt to correct obvious errors, but I expect that there are errors of grammar and possibly content that I did not discover before finalizing the note.

## 2025-01-15 NOTE — RESEARCH NOTE
Patient has been referred by Elisah Cortes M.D. The initial referral follow-up message, which includes the consent form link, has been sent to the patient.    Eligible Studies: HNP

## 2025-01-20 DIAGNOSIS — Z00.6 CLINICAL TRIAL PARTICIPANT: ICD-10-CM

## 2025-01-28 NOTE — RESEARCH NOTE
Patient's wife, Michelle Henry, called back and LVM. Patient is a  and is returning next week. Existing draw scheduled on Feb 12.    Wife's #: [phone]

## 2025-02-04 ENCOUNTER — PATIENT MESSAGE (OUTPATIENT)
Dept: MEDICAL GROUP | Facility: MEDICAL CENTER | Age: 40
End: 2025-02-04
Payer: COMMERCIAL

## 2025-02-04 DIAGNOSIS — F41.9 ANXIETY: ICD-10-CM

## 2025-02-05 RX ORDER — PROPRANOLOL HCL 20 MG
10-20 TABLET ORAL 2 TIMES DAILY PRN
Qty: 30 TABLET | Refills: 4 | Status: SHIPPED | OUTPATIENT
Start: 2025-02-05 | End: 2025-02-12

## 2025-02-11 ENCOUNTER — HOSPITAL ENCOUNTER (OUTPATIENT)
Dept: RADIOLOGY | Facility: MEDICAL CENTER | Age: 40
End: 2025-02-11
Attending: STUDENT IN AN ORGANIZED HEALTH CARE EDUCATION/TRAINING PROGRAM
Payer: COMMERCIAL

## 2025-02-11 DIAGNOSIS — Z82.3: ICD-10-CM

## 2025-02-11 DIAGNOSIS — E78.5 HYPERLIPIDEMIA, UNSPECIFIED HYPERLIPIDEMIA TYPE: ICD-10-CM

## 2025-02-11 PROCEDURE — 4410556 CT-CARDIAC SCORING (SELF PAY ONLY)

## 2025-02-12 ENCOUNTER — HOSPITAL ENCOUNTER (OUTPATIENT)
Dept: LAB | Facility: MEDICAL CENTER | Age: 40
End: 2025-02-12
Attending: STUDENT IN AN ORGANIZED HEALTH CARE EDUCATION/TRAINING PROGRAM
Payer: COMMERCIAL

## 2025-02-12 ENCOUNTER — HOSPITAL ENCOUNTER (OUTPATIENT)
Dept: LAB | Facility: MEDICAL CENTER | Age: 40
End: 2025-02-12
Attending: FAMILY MEDICINE

## 2025-02-12 DIAGNOSIS — Z00.6 CLINICAL TRIAL PARTICIPANT: ICD-10-CM

## 2025-02-12 DIAGNOSIS — F41.9 ANXIETY: ICD-10-CM

## 2025-02-12 DIAGNOSIS — E78.5 HYPERLIPIDEMIA, UNSPECIFIED HYPERLIPIDEMIA TYPE: ICD-10-CM

## 2025-02-12 DIAGNOSIS — Z00.00 HEALTHCARE MAINTENANCE: ICD-10-CM

## 2025-02-12 DIAGNOSIS — Z82.3: ICD-10-CM

## 2025-02-12 LAB
ERYTHROCYTE [DISTWIDTH] IN BLOOD BY AUTOMATED COUNT: 43.6 FL (ref 35.9–50)
EST. AVERAGE GLUCOSE BLD GHB EST-MCNC: 105 MG/DL
HBA1C MFR BLD: 5.3 % (ref 4–5.6)
HCT VFR BLD AUTO: 45.8 % (ref 42–52)
HGB BLD-MCNC: 16 G/DL (ref 14–18)
MCH RBC QN AUTO: 34 PG (ref 27–33)
MCHC RBC AUTO-ENTMCNC: 34.9 G/DL (ref 32.3–36.5)
MCV RBC AUTO: 97.4 FL (ref 81.4–97.8)
PLATELET # BLD AUTO: 338 K/UL (ref 164–446)
PMV BLD AUTO: 10.2 FL (ref 9–12.9)
RBC # BLD AUTO: 4.7 M/UL (ref 4.7–6.1)
WBC # BLD AUTO: 4.7 K/UL (ref 4.8–10.8)

## 2025-02-12 PROCEDURE — 83036 HEMOGLOBIN GLYCOSYLATED A1C: CPT

## 2025-02-12 PROCEDURE — 80053 COMPREHEN METABOLIC PANEL: CPT

## 2025-02-12 PROCEDURE — 85027 COMPLETE CBC AUTOMATED: CPT

## 2025-02-12 PROCEDURE — 83695 ASSAY OF LIPOPROTEIN(A): CPT

## 2025-02-12 PROCEDURE — 36415 COLL VENOUS BLD VENIPUNCTURE: CPT

## 2025-02-12 PROCEDURE — 84443 ASSAY THYROID STIM HORMONE: CPT

## 2025-02-12 PROCEDURE — 80061 LIPID PANEL: CPT

## 2025-02-12 RX ORDER — PROPRANOLOL HCL 20 MG
TABLET ORAL
Qty: 180 TABLET | Refills: 1 | Status: SHIPPED | OUTPATIENT
Start: 2025-02-12

## 2025-02-12 NOTE — TELEPHONE ENCOUNTER
Received request via: Patient    Was the patient seen in the last year in this department? Yes    Does the patient have an active prescription (recently filled or refills avail    Does the patient have intermediate Plus and need 100-day supply? (This applies to ALL medications) Patient does not have SCP

## 2025-02-13 ENCOUNTER — RESULTS FOLLOW-UP (OUTPATIENT)
Dept: MEDICAL GROUP | Facility: MEDICAL CENTER | Age: 40
End: 2025-02-13

## 2025-02-13 LAB
ALBUMIN SERPL BCP-MCNC: 4.7 G/DL (ref 3.2–4.9)
ALBUMIN/GLOB SERPL: 1.5 G/DL
ALP SERPL-CCNC: 74 U/L (ref 30–99)
ALT SERPL-CCNC: 75 U/L (ref 2–50)
ANION GAP SERPL CALC-SCNC: 12 MMOL/L (ref 7–16)
AST SERPL-CCNC: 40 U/L (ref 12–45)
BILIRUB SERPL-MCNC: 0.8 MG/DL (ref 0.1–1.5)
BUN SERPL-MCNC: 13 MG/DL (ref 8–22)
CALCIUM ALBUM COR SERPL-MCNC: 9.1 MG/DL (ref 8.5–10.5)
CALCIUM SERPL-MCNC: 9.7 MG/DL (ref 8.5–10.5)
CHLORIDE SERPL-SCNC: 100 MMOL/L (ref 96–112)
CHOLEST SERPL-MCNC: 257 MG/DL (ref 100–199)
CO2 SERPL-SCNC: 23 MMOL/L (ref 20–33)
CREAT SERPL-MCNC: 1 MG/DL (ref 0.5–1.4)
FASTING STATUS PATIENT QL REPORTED: NORMAL
GFR SERPLBLD CREATININE-BSD FMLA CKD-EPI: 97 ML/MIN/1.73 M 2
GLOBULIN SER CALC-MCNC: 3.2 G/DL (ref 1.9–3.5)
GLUCOSE SERPL-MCNC: 91 MG/DL (ref 65–99)
HDLC SERPL-MCNC: 48 MG/DL
LDLC SERPL CALC-MCNC: 176 MG/DL
POTASSIUM SERPL-SCNC: 4.2 MMOL/L (ref 3.6–5.5)
PROT SERPL-MCNC: 7.9 G/DL (ref 6–8.2)
SODIUM SERPL-SCNC: 135 MMOL/L (ref 135–145)
TRIGL SERPL-MCNC: 166 MG/DL (ref 0–149)
TSH SERPL DL<=0.005 MIU/L-ACNC: 1.91 UIU/ML (ref 0.38–5.33)

## 2025-02-14 LAB — LPA SERPL-MCNC: 125 MG/DL

## 2025-02-26 LAB
APOB+LDLR+PCSK9 GENE MUT ANL BLD/T: NOT DETECTED
BRCA1+BRCA2 DEL+DUP + FULL MUT ANL BLD/T: NOT DETECTED
MLH1+MSH2+MSH6+PMS2 GN DEL+DUP+FUL M: NOT DETECTED

## 2025-03-01 ENCOUNTER — RESULTS FOLLOW-UP (OUTPATIENT)
Dept: MEDICAL GROUP | Facility: MEDICAL CENTER | Age: 40
End: 2025-03-01

## 2025-04-08 DIAGNOSIS — I10 ESSENTIAL HYPERTENSION: ICD-10-CM

## 2025-04-08 RX ORDER — LOSARTAN POTASSIUM AND HYDROCHLOROTHIAZIDE 12.5; 5 MG/1; MG/1
1 TABLET ORAL DAILY
Qty: 90 TABLET | Refills: 0 | Status: SHIPPED | OUTPATIENT
Start: 2025-04-08

## 2025-04-08 NOTE — TELEPHONE ENCOUNTER
Received request via: Pharmacy    Was the patient seen in the last year in this department? Yes    Does the patient have an active prescription (recently filled or refills available) for medication(s) requested? No    Pharmacy Name: [facility] [address] [place] [address].     Does the patient have USP Plus and need 100-day supply? (This applies to ALL medications) Patient does not have SCP

## 2025-04-10 ENCOUNTER — TELEPHONE (OUTPATIENT)
Dept: HEALTH INFORMATION MANAGEMENT | Facility: OTHER | Age: 40
End: 2025-04-10
Payer: COMMERCIAL

## 2025-04-12 DIAGNOSIS — F41.9 ANXIETY: ICD-10-CM

## 2025-04-14 RX ORDER — SERTRALINE HYDROCHLORIDE 25 MG/1
25 TABLET, FILM COATED ORAL DAILY
Qty: 90 TABLET | Refills: 0 | Status: SHIPPED | OUTPATIENT
Start: 2025-04-14

## 2025-04-14 NOTE — TELEPHONE ENCOUNTER
Received request via: Pharmacy    Was the patient seen in the last year in this department? Yes    Does the patient have an active prescription (recently filled or refills available) for medication(s) requested? No    [facility]    Does the patient have USP Plus and need 100-day supply? (This applies to ALL medications) Patient does not have SCP

## 2025-04-15 ENCOUNTER — OFFICE VISIT (OUTPATIENT)
Dept: MEDICAL GROUP | Facility: MEDICAL CENTER | Age: 40
End: 2025-04-15
Payer: COMMERCIAL

## 2025-04-15 VITALS
TEMPERATURE: 96.6 F | BODY MASS INDEX: 33.26 KG/M2 | HEIGHT: 74 IN | WEIGHT: 259.2 LBS | SYSTOLIC BLOOD PRESSURE: 130 MMHG | DIASTOLIC BLOOD PRESSURE: 80 MMHG | OXYGEN SATURATION: 93 % | HEART RATE: 70 BPM

## 2025-04-15 DIAGNOSIS — J34.2 DNS (DEVIATED NASAL SEPTUM): ICD-10-CM

## 2025-04-15 DIAGNOSIS — R06.83 SNORING: ICD-10-CM

## 2025-04-15 DIAGNOSIS — I10 PRIMARY HYPERTENSION: ICD-10-CM

## 2025-04-15 DIAGNOSIS — E66.9 OBESITY (BMI 30-39.9): ICD-10-CM

## 2025-04-15 DIAGNOSIS — E78.5 HYPERLIPIDEMIA, UNSPECIFIED HYPERLIPIDEMIA TYPE: Primary | ICD-10-CM

## 2025-04-15 PROCEDURE — 3075F SYST BP GE 130 - 139MM HG: CPT | Performed by: STUDENT IN AN ORGANIZED HEALTH CARE EDUCATION/TRAINING PROGRAM

## 2025-04-15 PROCEDURE — 99214 OFFICE O/P EST MOD 30 MIN: CPT | Performed by: STUDENT IN AN ORGANIZED HEALTH CARE EDUCATION/TRAINING PROGRAM

## 2025-04-15 PROCEDURE — 3079F DIAST BP 80-89 MM HG: CPT | Performed by: STUDENT IN AN ORGANIZED HEALTH CARE EDUCATION/TRAINING PROGRAM

## 2025-04-15 ASSESSMENT — FIBROSIS 4 INDEX: FIB4 SCORE: 0.55

## 2025-04-15 NOTE — PROGRESS NOTES
Subjective:     CC:   Chief Complaint   Patient presents with    Hypertension Follow-up    Lab Results       HPI:   Vaibhav presents today with  Verbal consent was acquired by the patient to use [email or web address] ambient listening note generation during this visit Yes   History of Present Illness  The patient presents for evaluation of sleep apnea, anxiety, and hyperlipidemia.    A sleep study was conducted over two nights. The first night was unsuccessful due to a technical issue with the finger sensor, and the second night also failed to read properly. Despite these issues, an email was received indicating the completion of the study. No appointment has been scheduled with a sleep medicine specialist. A history of snoring is noted, and nasal surgery was performed on 03/18/2025. Post-surgery, significant improvement in sleep quality is reported, although occasional snoring persists. Adaptation to breathing through the nose is ongoing. Physical activity includes walking the dog in the park for approximately 1.5 miles, but jogging has not been resumed since surgery. Strenuous activities were advised against for 2 to 3 weeks post-surgery, and exercise has recently been restarted.    Anxiety symptoms have improved with the use of Zoloft 25 mg. A more relaxed and less driven demeanor is reported, attributed to the medication. Propranolol has been used on two or three occasions without adverse events. Consideration is being given to reducing the dosage of Zoloft due to occasional feelings of detachment.    Recent blood work results indicate an elevated lipoprotein (a) level, prompting interest in understanding the potential health impact.    Blood pressure was recorded as 198/120 during the hospital stay for surgery, necessitating immediate medical attention. Antihypertensive medication was administered, successfully lowering the blood pressure.    Albuterol is not frequently used.    PAST SURGICAL HISTORY:  Nasal surgery on  "03/18/2025    FAMILY HISTORY  - Family history of coronary artery disease and strokes    Results  Labs   - Cholesterol: 257 mg/dL   - LDL: 176 mg/dL   - Triglycerides: Decreased   - Lipoprotein (a): 125 mg/dL    Imaging   - CT scan: Score was zero    Diagnostic Testing   - Sleep study: 02/09/2025, 31 apneic spells in 6 hours with lowest saturation at 87%        Health Maintenance: Completed    ROS:  ROS    Review of systems unremarkable except for concerns noted by patient or items listed.    Please see HPI for additional ROS.      Objective:     Exam:  /80 (BP Location: Left arm, Patient Position: Sitting, BP Cuff Size: Adult)   Pulse 70   Temp 35.9 °C (96.6 °F) (Temporal)   Ht 1.88 m (6' 2\")   Wt 118 kg (259 lb 3.2 oz)   SpO2 93%   BMI 33.28 kg/m²  Body mass index is 33.28 kg/m².    Physical Exam  Constitutional:       General: He is not in acute distress.     Appearance: Normal appearance.   HENT:      Head: Normocephalic.   Cardiovascular:      Rate and Rhythm: Normal rate and regular rhythm.      Pulses: Normal pulses.      Heart sounds: Normal heart sounds.   Pulmonary:      Effort: Pulmonary effort is normal.      Breath sounds: Normal breath sounds.   Skin:     General: Skin is warm.   Neurological:      Mental Status: He is alert and oriented to person, place, and time.   Psychiatric:         Mood and Affect: Mood normal.         Behavior: Behavior normal.             Labs: reviewed  Reviewed and discussed today    Assessment & Plan:     39 y.o. male with the following -     1. Hyperlipidemia, unspecified hyperlipidemia type  Chronic,stable  - Cholesterol levels improved but remain elevated ( mg/dL, lipoprotein (a) level 125 mg/dL)  - Cardiac calcium score is zero, which is little reassuring  - Advise to lose weight, avoid red meat, and limit saturated fats  - Recommend Mediterranean diet focusing on healthy fats (flaxseeds, krysten seeds, hemp, salmon, omega-3 fatty fish)  - Continue " regular exercise, balancing cardio and strength training  - Follow-up lipid panel in 6 months to monitor progress  - Consider statin therapy if LDL levels > 190    2. Snoring  Chronic, improved  Patient was sent for sleep study for possible sleep apnea concern  He was also referred to ENT due to concern for deviated nasal septum which actually was positive along with some nasal polyps he is now status post surgery.  Wife reports that her his snoring has improved a lot and no more apneic spells  - Sleep study results were inconclusive with 1 study indicating pAHI number of 3.6 and the other 1 saying 5.  Overall most likely mild borderline sleep apnea indicate mild sleep apnea (index of 3.6, 31 apneic spells in 6 hours, lowest oxyge.  Also the study was done before his nasal septal and polyp surgery.  Most likely might have improved further after the surgery  Plan  - Reports better sleep quality and reduced snoring post-surgery  - After discussing risk benefits and possible options, shared decision was made to not start CPAP therapy at this time  - Advise to monitor weight and maintain regular physical activity,  -Weight loss will improve symptoms further  - Consider seeing a sleep medicine specialist appointment if symptoms persist      3. DNS (deviated nasal septum)  Chronic now status post surgery  Patient underwent nasal septum surgery as well as polypectomy    4. Primary hypertension  Chronic, stable  Continue Hyzaar 50-12.5 mg daily  - Blood pressure currently well-controlled  - Advise to continue monitoring blood pressure regularly  - No immediate changes to blood pressure management plan necessary        5. Anxiety:  Chronic, improved  - Anxiety well-managed with Zoloft 25 mg daily  - Reports feeling calmer and less stressed  - Uses propranolol as needed for situational anxiety, found effective  - Continue Zoloft 25 mg daily and propranolol as needed  - Option to take half a pill daily if Zoloft affects drive  or motivation  - Hydroxyzine can be used as an alternative for anxiety episodes at home      6. Obesity (BMI 30-39.9)  - Patient identified as having weight management issue.  Appropriate orders and counseling given.    Follow-up in 6 months for annual wellness    Please note that this dictation was created using voice recognition software. I have made every reasonable attempt to correct obvious errors, but I expect that there are errors of grammar and possibly content that I did not discover before finalizing the note.

## 2025-04-18 ENCOUNTER — TELEPHONE (OUTPATIENT)
Dept: HEALTH INFORMATION MANAGEMENT | Facility: OTHER | Age: 40
End: 2025-04-18
Payer: COMMERCIAL

## 2025-04-25 ENCOUNTER — OFFICE VISIT (OUTPATIENT)
Dept: URGENT CARE | Facility: CLINIC | Age: 40
End: 2025-04-25
Payer: COMMERCIAL

## 2025-04-25 VITALS
BODY MASS INDEX: 34.4 KG/M2 | HEIGHT: 72 IN | TEMPERATURE: 97.5 F | SYSTOLIC BLOOD PRESSURE: 136 MMHG | HEART RATE: 75 BPM | DIASTOLIC BLOOD PRESSURE: 96 MMHG | OXYGEN SATURATION: 97 % | RESPIRATION RATE: 16 BRPM | WEIGHT: 254 LBS

## 2025-04-25 DIAGNOSIS — J02.0 STREP PHARYNGITIS: ICD-10-CM

## 2025-04-25 DIAGNOSIS — J02.9 PHARYNGITIS, UNSPECIFIED ETIOLOGY: ICD-10-CM

## 2025-04-25 LAB — S PYO DNA SPEC NAA+PROBE: DETECTED

## 2025-04-25 PROCEDURE — 3080F DIAST BP >= 90 MM HG: CPT

## 2025-04-25 PROCEDURE — 3075F SYST BP GE 130 - 139MM HG: CPT

## 2025-04-25 PROCEDURE — 99203 OFFICE O/P NEW LOW 30 MIN: CPT

## 2025-04-25 PROCEDURE — 87651 STREP A DNA AMP PROBE: CPT

## 2025-04-25 RX ORDER — AMOXICILLIN 500 MG/1
1 TABLET, FILM COATED ORAL 2 TIMES DAILY
COMMUNITY
Start: 2025-03-18 | End: 2025-04-25

## 2025-04-25 RX ORDER — PREDNISONE 10 MG/1
TABLET ORAL
COMMUNITY
Start: 2025-03-18

## 2025-04-25 RX ORDER — HYDROCODONE BITARTRATE AND ACETAMINOPHEN 5; 325 MG/1; MG/1
TABLET ORAL
COMMUNITY
Start: 2025-03-18

## 2025-04-25 ASSESSMENT — FIBROSIS 4 INDEX: FIB4 SCORE: 0.55

## 2025-04-26 ENCOUNTER — RESULTS FOLLOW-UP (OUTPATIENT)
Dept: URGENT CARE | Facility: PHYSICIAN GROUP | Age: 40
End: 2025-04-26

## 2025-04-26 ASSESSMENT — ENCOUNTER SYMPTOMS
SORE THROAT: 1
FEVER: 0
CHILLS: 0

## 2025-04-26 NOTE — PROGRESS NOTES
CHIEF COMPLAINT  Chief Complaint   Patient presents with    Fever     Fever, swollen tongue, pain down right side of throat x 4 days     Subjective:   Vaibhav Guillermo is a 39 y.o. male who presents to urgent care with concerns for fever and tonsil swelling x 4 days.  Patient reports that pain is worse to right side.  Does report potential subjective fever as well as chills.  Patient states with concern as he did have completion of recent ENT surgery, and was concern for bacterial infection.  He states he has been taking OTC analgesics as well as gargling warm salt water and attempt alleviate swelling and discomfort.  Denies any pertinent sick contacts.        Review of Systems   Constitutional:  Negative for chills and fever.   HENT:  Positive for sore throat.        PAST MEDICAL HISTORY  Patient Active Problem List    Diagnosis Date Noted    Obesity (BMI 30-39.9) 04/15/2025    Transaminitis 11/19/2021    Anxiety and depression 03/30/2021    Androgenic alopecia 08/07/2020    Dupuytren contracture 08/07/2020    Snoring 02/01/2019    Tobacco abuse 01/26/2019    Hyperlipidemia 01/26/2019    Neck pain 05/09/2018    Reactive airway disease with wheezing     Hypertension        SURGICAL HISTORY  patient denies any surgical history    ALLERGIES  No Known Allergies    CURRENT MEDICATIONS  Home Medications       Reviewed by Rodolfo Quezada Ass't (Medical Assistant) on 04/25/25 at 1812  Med List Status: <None>     Medication Last Dose Status   albuterol 108 (90 Base) MCG/ACT Aero Soln inhalation aerosol PRN Active   Amoxicillin 500 MG Tab Not Taking Flagged for Removal   cetirizine (ZYRTEC) 10 MG Tab Taking Active   fluticasone (FLONASE) 50 MCG/ACT nasal spray Taking Active   HYDROcodone-acetaminophen (NORCO) 5-325 MG Tab per tablet Not Taking Active   losartan-hydrochlorothiazide (HYZAAR) 50-12.5 MG per tablet Taking Active   multivitamin (THERAGRAN) Tab Taking Active   Omega-3 Fatty Acids (FISH OIL PO) Taking Active    predniSONE (DELTASONE) 10 MG Tab Not Taking Active   propranolol (INDERAL) 20 MG Tab Taking Active   sertraline (ZOLOFT) 25 MG tablet Taking Active   triamcinolone acetonide (KENALOG) 0.1 % Ointment Taking Active                    SOCIAL HISTORY  Social History     Tobacco Use    Smoking status: Former     Current packs/day: 0.00     Types: Cigarettes     Quit date: 3/1/2011     Years since quittin.1    Smokeless tobacco: Former     Types: Chew     Quit date: 2019    Tobacco comments:     Zyn pouches    Vaping Use    Vaping status: Never Used   Substance and Sexual Activity    Alcohol use: Not Currently     Alcohol/week: 1.8 oz     Types: 3 Cans of beer per week     Comment: was drinking heavy on weekends now 2-3 per week    Drug use: Yes     Types: Marijuana     Comment: Occasional    Sexual activity: Yes     Partners: Female       FAMILY HISTORY  Family History   Problem Relation Age of Onset    Stroke Paternal Grandfather 50    Hypertension Paternal Grandfather     Hypertension Paternal Uncle     Cancer Maternal Grandfather         prostate, colon    No Known Problems Mother     Hypertension Father     No Known Problems Sister          Medications, Allergies, and current problem list reviewed today in Epic.     Objective:     BP (!) 136/96 (BP Location: Left arm, Patient Position: Sitting, BP Cuff Size: Adult)   Pulse 75   Temp 36.4 °C (97.5 °F) (Temporal)   Resp 16   Ht 1.829 m (6')   Wt 115 kg (254 lb)   SpO2 97%     Physical Exam  Vitals reviewed.   Constitutional:       General: He is not in acute distress.     Appearance: Normal appearance. He is not ill-appearing or toxic-appearing.   HENT:      Head: Normocephalic.      Mouth/Throat:      Mouth: Mucous membranes are moist.      Pharynx: Oropharynx is clear. Uvula midline. Posterior oropharyngeal erythema present. No oropharyngeal exudate.      Tonsils: No tonsillar abscesses. 2+ on the right. 2+ on the left.   Cardiovascular:      Rate  and Rhythm: Normal rate.      Pulses: Normal pulses.   Pulmonary:      Effort: Pulmonary effort is normal. No respiratory distress.      Breath sounds: No stridor. No wheezing, rhonchi or rales.   Musculoskeletal:      Cervical back: Neck supple.   Skin:     General: Skin is warm.      Capillary Refill: Capillary refill takes less than 2 seconds.   Neurological:      General: No focal deficit present.      Mental Status: He is alert.   Psychiatric:         Mood and Affect: Mood normal.       Lab Results/POC Test Results   Results for orders placed or performed in visit on 04/25/25   POCT GROUP A STREP, PCR    Collection Time: 04/25/25  7:08 PM   Result Value Ref Range    POC Group A Strep, PCR Detected (A) Not Detected, Invalid             Assessment/Plan:     Diagnosis and associated orders:     1. Pharyngitis, unspecified etiology  POCT GROUP A STREP, PCR    amoxicillin-clavulanate (AUGMENTIN) 875-125 MG Tab      2. Strep pharyngitis           Comments/MDM:       Rapid POC Strep testing POSITIVE.  Patient has mild pharyngeal erythema.  Bilateral tonsils are 2+.  No peritonsillar swelling or unilateral deviation.  Uvula is midline.  Normal passive range of motion to neck.    Management includes completion of antibiotics, new toothbrush after day 3 of antibiotics, discarding/sanitizing any other dental equipment, soft foods, increased fluids.  Management of symptoms is discussed and expected course is outlined.   Patient instructed to present to Emergency Room for discussed red flag signs and symptoms including unilateral swelling, muffled voice, difficulty handling secretions.          Differential diagnosis, natural history, supportive care, and indications for immediate follow-up discussed.    Advised the patient to follow-up with the primary care physician for recheck, reevaluation, and consideration of further management.    Please note that this dictation was created using voice recognition software. I have  made a reasonable attempt to correct obvious errors, but I expect that there are errors of grammar and possibly content that I did not discover before finalizing the note.    This note was electronically signed by KENNETH Wetzel

## 2025-04-26 NOTE — TELEPHONE ENCOUNTER
Patient called to discuss results. Prescriptions sent to preferred pharmacy. Change toothbrush 3 days into treatment. Sterilize water bottles. Return to clinic if symptoms worsen or fail to resolve.

## 2025-07-04 DIAGNOSIS — I10 ESSENTIAL HYPERTENSION: ICD-10-CM

## 2025-07-07 RX ORDER — LOSARTAN POTASSIUM AND HYDROCHLOROTHIAZIDE 12.5; 5 MG/1; MG/1
1 TABLET ORAL DAILY
Qty: 90 TABLET | Refills: 0 | Status: SHIPPED | OUTPATIENT
Start: 2025-07-07

## 2025-07-20 DIAGNOSIS — F41.9 ANXIETY: ICD-10-CM

## 2025-07-21 RX ORDER — SERTRALINE HYDROCHLORIDE 25 MG/1
25 TABLET, FILM COATED ORAL DAILY
Qty: 90 TABLET | Refills: 0 | Status: SHIPPED | OUTPATIENT
Start: 2025-07-21